# Patient Record
Sex: MALE | Race: WHITE | NOT HISPANIC OR LATINO | Employment: OTHER | URBAN - METROPOLITAN AREA
[De-identification: names, ages, dates, MRNs, and addresses within clinical notes are randomized per-mention and may not be internally consistent; named-entity substitution may affect disease eponyms.]

---

## 2017-01-25 ENCOUNTER — ALLSCRIPTS OFFICE VISIT (OUTPATIENT)
Dept: OTHER | Facility: OTHER | Age: 57
End: 2017-01-25

## 2017-02-01 ENCOUNTER — GENERIC CONVERSION - ENCOUNTER (OUTPATIENT)
Dept: OTHER | Facility: OTHER | Age: 57
End: 2017-02-01

## 2017-02-20 ENCOUNTER — TRANSCRIBE ORDERS (OUTPATIENT)
Dept: LAB | Facility: CLINIC | Age: 57
End: 2017-02-20

## 2017-02-20 ENCOUNTER — APPOINTMENT (OUTPATIENT)
Dept: LAB | Facility: CLINIC | Age: 57
End: 2017-02-20
Payer: COMMERCIAL

## 2017-02-20 DIAGNOSIS — M81.0 OSTEOPOROSIS, UNSPECIFIED: ICD-10-CM

## 2017-02-20 DIAGNOSIS — Z13.1 SCREENING FOR DIABETES MELLITUS: ICD-10-CM

## 2017-02-20 DIAGNOSIS — M81.0 OSTEOPOROSIS, UNSPECIFIED: Primary | ICD-10-CM

## 2017-02-20 LAB
25(OH)D3 SERPL-MCNC: 21.2 NG/ML (ref 30–100)
ALBUMIN SERPL BCP-MCNC: 3.3 G/DL (ref 3.5–5)
CALCIUM SERPL-MCNC: 8.9 MG/DL (ref 8.3–10.1)
CREAT SERPL-MCNC: 0.97 MG/DL (ref 0.6–1.3)
GFR SERPL CREATININE-BSD FRML MDRD: >60 ML/MIN/1.73SQ M
PHOSPHATE SERPL-MCNC: 3.7 MG/DL (ref 2.7–4.5)
PTH-INTACT SERPL-MCNC: 127.4 PG/ML (ref 14–72)

## 2017-02-20 PROCEDURE — 82040 ASSAY OF SERUM ALBUMIN: CPT

## 2017-02-20 PROCEDURE — 82306 VITAMIN D 25 HYDROXY: CPT

## 2017-02-20 PROCEDURE — 82310 ASSAY OF CALCIUM: CPT

## 2017-02-20 PROCEDURE — 36415 COLL VENOUS BLD VENIPUNCTURE: CPT

## 2017-02-20 PROCEDURE — 84166 PROTEIN E-PHORESIS/URINE/CSF: CPT | Performed by: INTERNAL MEDICINE

## 2017-02-20 PROCEDURE — 84100 ASSAY OF PHOSPHORUS: CPT

## 2017-02-20 PROCEDURE — 82565 ASSAY OF CREATININE: CPT

## 2017-02-20 PROCEDURE — 83970 ASSAY OF PARATHORMONE: CPT

## 2017-02-20 PROCEDURE — 84165 PROTEIN E-PHORESIS SERUM: CPT

## 2017-02-22 LAB
ALBUMIN SERPL ELPH-MCNC: 3.64 G/DL (ref 3.5–5)
ALBUMIN SERPL ELPH-MCNC: 52.7 % (ref 52–65)
ALBUMIN UR ELPH-MCNC: 100 %
ALPHA1 GLOB MFR UR ELPH: 0 %
ALPHA1 GLOB SERPL ELPH-MCNC: 0.47 G/DL (ref 0.1–0.4)
ALPHA1 GLOB SERPL ELPH-MCNC: 6.8 % (ref 2.5–5)
ALPHA2 GLOB MFR UR ELPH: 0 %
ALPHA2 GLOB SERPL ELPH-MCNC: 0.81 G/DL (ref 0.4–1.2)
ALPHA2 GLOB SERPL ELPH-MCNC: 11.7 % (ref 7–13)
B-GLOBULIN MFR UR ELPH: 0 %
BETA GLOB ABNORMAL SERPL ELPH-MCNC: 0.56 G/DL (ref 0.4–0.8)
BETA1 GLOB SERPL ELPH-MCNC: 8.1 % (ref 5–13)
BETA2 GLOB SERPL ELPH-MCNC: 6.5 % (ref 2–8)
BETA2+GAMMA GLOB SERPL ELPH-MCNC: 0.45 G/DL (ref 0.2–0.5)
GAMMA GLOB ABNORMAL SERPL ELPH-MCNC: 0.98 G/DL (ref 0.5–1.6)
GAMMA GLOB MFR UR ELPH: 0 %
GAMMA GLOB SERPL ELPH-MCNC: 14.2 % (ref 12–22)
IGG/ALB SER: 1.11 {RATIO} (ref 1.1–1.8)
PROT PATTERN SERPL ELPH-IMP: ABNORMAL
PROT PATTERN UR ELPH-IMP: NORMAL
PROT SERPL-MCNC: 6.9 G/DL (ref 6.4–8.2)
PROT UR-MCNC: 10 MG/DL

## 2017-03-15 ENCOUNTER — GENERIC CONVERSION - ENCOUNTER (OUTPATIENT)
Dept: OTHER | Facility: OTHER | Age: 57
End: 2017-03-15

## 2017-03-22 ENCOUNTER — GENERIC CONVERSION - ENCOUNTER (OUTPATIENT)
Dept: OTHER | Facility: OTHER | Age: 57
End: 2017-03-22

## 2017-04-05 ENCOUNTER — GENERIC CONVERSION - ENCOUNTER (OUTPATIENT)
Dept: OTHER | Facility: OTHER | Age: 57
End: 2017-04-05

## 2017-04-18 ENCOUNTER — ALLSCRIPTS OFFICE VISIT (OUTPATIENT)
Dept: OTHER | Facility: OTHER | Age: 57
End: 2017-04-18

## 2017-04-19 ENCOUNTER — GENERIC CONVERSION - ENCOUNTER (OUTPATIENT)
Dept: OTHER | Facility: OTHER | Age: 57
End: 2017-04-19

## 2017-04-25 ENCOUNTER — GENERIC CONVERSION - ENCOUNTER (OUTPATIENT)
Dept: OTHER | Facility: OTHER | Age: 57
End: 2017-04-25

## 2018-01-09 NOTE — MISCELLANEOUS
Message  Per Dr Talha Wilson, pt  has been referred to [facility] for evaluation for lung transplant  Records have been faxed and Anup Farley will call pt  after records have been reviewed  Active Problems    1  Abnormal chest CT (793 2) (R93 8)   2  Acinetobacter lwoffi infection (041 85) (A49 8)   3  Allergic rhinitis (477 9) (J30 9)   4  Anticoagulant long-term use (V58 61) (Z79 01)   5  Anxiety (300 00) (F41 9)   6  Benign essential hypertension (401 1) (I10)   7  Bronchiectasis (494 0) (J47 9)   8  Bronchiectasis with acute exacerbation (494 1) (J47 1)   9  Chest pain on breathing (786 52) (R07 1)   10  CHF (congestive heart failure) (428 0) (I50 9)   11  Chronic bronchitis (491 9) (J42)   12  Chronic narcotic dependence (304 91) (F11 20)   13  Chronic obstructive pulmonary disease (496) (J44 9)   14  Chronic pain (338 29) (G89 29)   15  Chronic pain syndrome (338 4) (G89 4)   16  Chronic respiratory failure with hypoxia (518 83,799 02) (J96 11)   17  Chronic thoracic spine pain (724 1,338 29) (M54 6,G89 29)   18  Constipation, unspecified constipation type (564 00) (K59 00)   19  Cor pulmonale (416 9) (I27 81)   20  Cough (786 2) (R05)   21  Depression (311) (F32 9)   22  Edema (782 3) (R60 9)   23  Emphysema (492 8) (J43 9)   24  Encounter for long-term use of opiate analgesic (V58 69) (Z79 891)   25  Esophageal reflux (530 81) (K21 9)   26  Grief at loss of child (309 0) (F43 21,Z63 4)   27  Insomnia (780 52) (G47 00)   28  Low back pain (724 2) (M54 5)   29  Lymphadenopathy, mediastinal (785 6) (R59 0)   30  Myofascial pain syndrome (729 1) (M79 1)   31  Nonspecific abnormal findings on radiological and examination of lung field (793 19)    (R91 8)   32  Osteoporosis (733 00) (M81 0)   33  Pain, upper back (724 5) (M54 9)   34  Paroxysmal atrial fibrillation (427 31) (I48 0)   35  Pneumonia (486) (J18 9)   36  PSVT (paroxysmal supraventricular tachycardia) (427 0) (I47 1)   37   Pulmonary mycobacteria (031  0) (A31 0)   38  Pulmonary nodule seen on imaging study (793 11) (R91 1)   39  Rib pain (786 50) (R07 81)   40  Screening for colon cancer (V76 51) (Z12 11)   41  Shortness of breath (786 05) (R06 02)   42  Steroid dependent (V58 65)   43  Traumatic compression fracture of T6 thoracic vertebra (805 2) (S22 050A)   44  Ventricular bigeminy (427 89) (I49 9)   45  Wrist pain, right (719 43) (M25 531)    Current Meds   1  ALPRAZolam 0 5 MG Oral Tablet; TAKE 1 TABLET EVERY 6 TO 8 HOURS AS NEEDED; Therapy: 58WVV1673 to (Evaluate:29Jun2016); Last Rx:14Jun2016 Ordered   2  Calcium 341--519 Oral Tablet; tke one tab daily; Therapy: (Recorded:05Apr2016) to Recorded   3  Diltiazem HCl  MG Oral Capsule Extended Release 24 Hour; TAKE ONE   CAPSULE BY MOUTH EVERY DAY; Therapy: 62IYE1515 to ()  Requested for: 28Apr2016; Last   Rx:28Apr2016 Ordered   4  Eliquis 5 MG Oral Tablet; TWICE A DAY; Therapy: 61WTS3300 to Recorded   5  Flecainide Acetate 100 MG Oral Tablet; take 1 tablet by mouth twice daily; Therapy: 66FLL8519 to (Evaluate:75Gcr1006)  Requested for: 31MMK7918; Last   Rx:17Jan2016 Ordered   6  Furosemide 40 MG Oral Tablet; TAKE 1 TABLET TWICE DAILY; Therapy: 28Apr2016 to (Evaluate:15Pow9358)  Requested for: 28Apr2016; Last   Rx:28Apr2016 Ordered   7  Ipratropium-Albuterol 0 5-2 5 (3) MG/3ML Inhalation Solution; USE 1 VIAL VIA   NEBULIZER EVERY 4 HOURS AS NEEDED; Therapy: 02PVC1344 to (Evaluate:77Klp1448)  Requested for: 20Jun2016; Last   Rx:20Jun2016 Ordered   8  Lansoprazole 30 MG Oral Capsule Delayed Release; TAKE 1 CAPSULE Daily; Therapy: (Recorded:52Tqb7700) to Recorded   9  Metamucil POWD; TWICE A DAY; Therapy: (Recorded:31Aqv6575) to Recorded   10  MiraLax Oral Powder (Polyethylene Glycol 3350); take 17GM (DISSOLVED IN WATER    OR JUICE) by mouth once daily; Therapy: 37MYB5918 to (Evaluate:08Lcf1683) Recorded   11   Oxycodone-Acetaminophen  MG Oral Tablet; TAKE 1 TABLET 4 times daily PRN    pain; Therapy: 16Khi3920 to (Evaluate:51Naw3600); Last Rx:39Ezp6468 Ordered   12  PredniSONE 5 MG Oral Tablet; take 1 tablet by mouth daily; Therapy: 71XKP5123 to (Evaluate:64Aas4226)  Requested for: 60OSB9590; Last    Rx:21Mar2016 Ordered   13  Symbicort 160-4 5 MCG/ACT Inhalation Aerosol; USE 2 INHALATIONS BY MOUTH    TWICE DAILY*** RINSE MOUTH AFTER USE; Therapy: 15DQD2086 to (Evaluate:06Kyt1514)  Requested for: 63QRQ6874 Recorded   14  Ventolin  (90 Base) MCG/ACT Inhalation Aerosol Solution; USE 2 PUFFS BY    MOUTH EVERY 4 TO 6 HOURS AS NEEDED; Therapy: 38XRS9413 to (Evaluate:10Cfp2904)  Requested for: 97HKV0227; Last    Rx:82Pxe7330 Ordered   15  Zolpidem Tartrate 10 MG Oral Tablet; TAKE 1 TABLET AT BEDTIME AS NEEDED; Therapy: 15DBS6722 to (Last Rx:65Dxj0410) Ordered    Allergies    1   No Known Drug Allergies    Signatures   Electronically signed by : Lizzie Plunkett, ; Jun 29 2016 10:01AM EST                       (Author)

## 2018-01-09 NOTE — RESULT NOTES
Message   Recorded as Task   Date: 07/13/2016 10:30 AM, Created By: Tim   Task Name: Med Renewal Request   Assigned To: [address]   Regarding Patient: Key Baron, Status: Active   Comment:    Melody Key - 13 Jul 2016 10:30 AM     TASK CREATED  Caller: Self; Renew Medication; [phone] (Mobile Phone)  Pt lmom stating that [address] instructed him to call the office when he needed refills  Pt was seen on 7/5/16  [facility] - 13 Jul 2016 10:32 AM     TASK REPLIED TO: Previously Assigned To [address]  Could you find out which medications he needs refills on? Also, I will have them ready for him to  from [facility] on Friday, 7/15, so please remind me then  Melody Key - 13 Jul 2016 10:47 AM     TASK EDITED  Spoke to pt,requesting oxycodone 10/325mg one tab q 6hours  Pt will  at [facility] on friday  Please keep active in your tasks for friday  [facility] - 15 Jul 2016 8:10 AM     TASK REPLIED TO: Previously Assigned To [address] printed for pickup today at [facility]  Nubia Argueta - 15 Jul 2016 10:18 AM     TASK EDITED  Pt  came and picked up the script  Thanks   Nano Spears - 15 Jul 2016 10:36 AM     TASK REPLIED TO: Previously Assigned To [place]  Thanks          Signatures   Electronically signed by : Brandon Honeyctut, ; Jul 15 2016 10:48AM EST                       (Author)

## 2018-01-09 NOTE — MISCELLANEOUS
Message   Recorded as Task   Date: 06/14/2016 08:53 AM, Created By: Jonatan Zaragoza   Task Name: Med Renewal Request   Assigned To: [address]   Regarding Patient: Tico Ovalles, Status: Active   CommentJuanita Dakota - 14 Jun 2016 8:53 AM     TASK CREATED  Caller: Self; Renew Medication; [phone] (Home); [phone] (Mobile Phone)  Pt lmom stating that he got out of the hospital and needs a refill  Called pt and he states that he needs a refill of the oxycodone 10/325mg QID, pt doesn't feel that he can decrease to TID at present due to his pain being severe  Stated they were giving him a "heavy duty pain pill" while in the hospital but unaware as to the name  Pt was not discharged w/medications  [address] - 14 Jun 2016 10:03 AM     TASK REPLIED TO: Previously Assigned To [address]  Printed prescription for oxycodone 10/325 mg QID PRN pain here at [facility]  Please ask patient to   The patient also needs to take any remaining oxycontin and oxycodone/acetaminophen 5/325 mg back to his pharmacy for proper disposal    Jonatan Zaragoza - 14 Jun 2016 10:06 AM     TASK EDITED  Pt aware and will have wife p/u script  Active Problems    1  Abnormal chest CT (793 2) (R93 8)   2  Acinetobacter lwoffi infection (041 85) (A49 8)   3  Allergic rhinitis (477 9) (J30 9)   4  Anticoagulant long-term use (V58 61) (Z79 01)   5  Anxiety (300 00) (F41 9)   6  Benign essential hypertension (401 1) (I10)   7  Bronchiectasis (494 0) (J47 9)   8  Bronchiectasis with acute exacerbation (494 1) (J47 1)   9  Chest pain on breathing (786 52) (R07 1)   10  CHF (congestive heart failure) (428 0) (I50 9)   11  Chronic bronchitis (491 9) (J42)   12  Chronic narcotic dependence (304 91) (F11 20)   13  Chronic obstructive pulmonary disease (496) (J44 9)   14  Chronic pain (338 29) (G89 29)   15  Chronic pain syndrome (338 4) (G89 4)   16   Chronic respiratory failure with hypoxia (518 83,799 02) (J96 11)   17  Chronic thoracic spine pain (724 1,338 29) (M54 6,G89 29)   18  Constipation, unspecified constipation type (564 00) (K59 00)   19  Cor pulmonale (416 9) (I27 81)   20  Cough (786 2) (R05)   21  Depression (311) (F32 9)   22  Edema (782 3) (R60 9)   23  Emphysema (492 8) (J43 9)   24  Encounter for long-term use of opiate analgesic (V58 69) (Z79 891)   25  Esophageal reflux (530 81) (K21 9)   26  Grief at loss of child (309 0) (F43 21,Z63 4)   27  Insomnia (780 52) (G47 00)   28  Low back pain (724 2) (M54 5)   29  Lymphadenopathy, mediastinal (785 6) (R59 0)   30  Myofascial pain syndrome (729 1) (M79 1)   31  Nonspecific abnormal findings on radiological and examination of lung field (793 19)    (R91 8)   32  Osteoporosis (733 00) (M81 0)   33  Pain, upper back (724 5) (M54 9)   34  Paroxysmal atrial fibrillation (427 31) (I48 0)   35  Pneumonia (486) (J18 9)   36  PSVT (paroxysmal supraventricular tachycardia) (427 0) (I47 1)   37  Pulmonary mycobacteria (031 0) (A31 0)   38  Pulmonary nodule seen on imaging study (793 11) (R91 1)   39  Rib pain (786 50) (R07 81)   40  Screening for colon cancer (V76 51) (Z12 11)   41  Shortness of breath (786 05) (R06 02)   42  Steroid dependent (V58 65)   43  Traumatic compression fracture of T6 thoracic vertebra (805 2) (S22 050A)   44  Ventricular bigeminy (427 89) (I49 9)   45  Wrist pain, right (719 43) (M25 531)    Current Meds   1  ALPRAZolam 0 5 MG Oral Tablet; TAKE 1 TABLET EVERY 6 TO 8 HOURS AS NEEDED; Therapy: 68VZR5025 to (Evaluate:29Jun2016); Last Rx:14Jun2016 Ordered   2  Calcium 575--815 Oral Tablet; tke one tab daily; Therapy: (Recorded:05Apr2016) to Recorded   3  Diltiazem HCl  MG Oral Capsule Extended Release 24 Hour; TAKE ONE   CAPSULE BY MOUTH EVERY DAY; Therapy: 17VXE1055 to ([phone] [phone])  Requested for: 28Apr2016; Last   Rx:28Apr2016 Ordered   4   Eliquis 5 MG Oral Tablet; TWICE A DAY; Therapy: 70WPT0861 to Recorded   5  Flecainide Acetate 100 MG Oral Tablet; take 1 tablet by mouth twice daily; Therapy: 54FNF1900 to (Evaluate:40Wgz0301)  Requested for: 48RHB7024; Last   Rx:17Jan2016 Ordered   6  Furosemide 40 MG Oral Tablet; TAKE 1 TABLET TWICE DAILY; Therapy: 89Zkw0818 to (Evaluate:58Muh2592)  Requested for: 71Wao4781; Last   Rx:28Apr2016 Ordered   7  Ipratropium-Albuterol 0 5-2 5 (3) MG/3ML Inhalation Solution; USE 1 VIAL VIA   NEBULIZER EVERY 4 HOURS AS NEEDED; Therapy: 70OAQ2451 to (Evaluate:24Jun2016)  Requested for: 88NHC7703; Last   Rx:87Fdq1516 Ordered   8  Lansoprazole 30 MG Oral Capsule Delayed Release; TAKE 1 CAPSULE Daily; Therapy: (Recorded:15Aja4370) to Recorded   9  Lorzone 750 MG Oral Tablet; TAKE 1 TABLET 3 times daily PRN muscle spasms; Therapy: 43IUD0530 to (Evaluate:74Cik8108)  Requested for: 48WAC8231; Last   Rx:07Jun2016 Ordered   10  Metamucil POWD; TWICE A DAY; Therapy: (Recorded:10Qgo7263) to Recorded   11  MiraLax Oral Powder (Polyethylene Glycol 3350); take 17GM (DISSOLVED IN WATER    OR JUICE) by mouth once daily; Therapy: 93YNS1078 to (Evaluate:99Weq6033) Recorded   12  Oxycodone-Acetaminophen  MG Oral Tablet; TAKE 1 TABLET 4 times daily PRN    pain; Therapy: 47Ylv1051 to (Evaluate:18Jql7852); Last Rx:14Jun2016 Ordered   13  PredniSONE 10 MG Oral Tablet; TAKE 4 TABLETS DAILY FOR 3 DAYS,3 TABLETS    DAILY FOR 3 DAYS, 2 TABLETS DAILY FOR 3 DAYS AND 1 TABLET DAILY FOR    3 DAYS, THEN STOP; Therapy: 81VNK1225 to (Evaluate:09Jun2016)  Requested for: 51BTV1984; Last    LO:74DMD4793 Ordered   14  PredniSONE 5 MG Oral Tablet; take 1 tablet by mouth daily; Therapy: 03EHC2037 to (Evaluate:20Apr2016)  Requested for: 42CLD8509; Last    Rx:21Mar2016 Ordered   15  Symbicort 160-4 5 MCG/ACT Inhalation Aerosol; USE 2 INHALATIONS BY MOUTH    TWICE DAILY*** RINSE MOUTH AFTER USE;     Therapy: 28WWN8371 to (Evaluate:31Gjf0988)  Requested for: 86ANR7982 Recorded   16  Ventolin  (90 Base) MCG/ACT Inhalation Aerosol Solution; USE 2 PUFFS BY    MOUTH EVERY 4 TO 6 HOURS AS NEEDED; Therapy: 78KZS0552 to (Evaluate:24Wtd7949)  Requested for: 40LWG4569; Last    Rx:66Djd6267 Ordered   17  Zolpidem Tartrate 10 MG Oral Tablet; TAKE 1 TABLET AT BEDTIME AS NEEDED; Therapy: 67LAA6213 to (Last Rx:40Dxn1806) Ordered    Allergies    1   No Known Drug Allergies    Signatures   Electronically signed by : Tyler Manuel RN; Jun 14 2016 10:06AM EST                       (Author)

## 2018-01-10 NOTE — MISCELLANEOUS
Message  Spoke with Isaiah Hensley about the Oxycontin  Says he tried to twist off the top to a container yesterday and his back pain increased  He has a prior compression fracture of the back  He WPU Rx but also agrees to see Dr Kristin Hendrix to see if pain management can help with the pain        Plan  Traumatic compression fracture of T6 thoracic vertebra    · OxyCODONE HCl - 5 MG Oral Tablet; 1-2 EVERY 6 HOURS AS NEEDED FOR  PAIN    Signatures   Electronically signed by : SARAH Barrios ; Jan 28 2016  8:54AM EST                       (Author)

## 2018-01-10 NOTE — MISCELLANEOUS
History of Present Illness  TCM Communication [facility]: The patient is being contacted for follow-up after hospitalization  He was hospitalized at [facility]  The dates of hospitalization: June 22, 2016, date of admission: June 22, 2016, date of discharge: June 29, 2016  Diagnosis: acute respiratory failure with hypoxia & shortness of breath  He was discharged to home  Follow-up appointments with other specialists: pt  going to follow up with [facility] in September, date unknown @ this time  Communication performed and completed by Brenda Escobar   HPI: He has a follow-up with Dr [address] 7/21/16 at 1:00  Active Problems    1  Abnormal chest CT (793 2) (R93 8)   2  Acinetobacter lwoffi infection (041 85) (A49 8)   3  Allergic rhinitis (477 9) (J30 9)   4  Anticoagulant long-term use (V58 61) (Z79 01)   5  Anxiety (300 00) (F41 9)   6  Benign essential hypertension (401 1) (I10)   7  Bronchiectasis (494 0) (J47 9)   8  Bronchiectasis with acute exacerbation (494 1) (J47 1)   9  Chest pain on breathing (786 52) (R07 1)   10  CHF (congestive heart failure) (428 0) (I50 9)   11  Chronic bronchitis (491 9) (J42)   12  Chronic narcotic dependence (304 91) (F11 20)   13  Chronic obstructive pulmonary disease (496) (J44 9)   14  Chronic pain (338 29) (G89 29)   15  Chronic pain syndrome (338 4) (G89 4)   16  Chronic respiratory failure with hypoxia (518 83,799 02) (J96 11)   17  Chronic thoracic spine pain (724 1,338 29) (M54 6,G89 29)   18  Constipation, unspecified constipation type (564 00) (K59 00)   19  Cor pulmonale (416 9) (I27 81)   20  Cough (786 2) (R05)   21  Depression (311) (F32 9)   22  Edema (782 3) (R60 9)   23  Emphysema (492 8) (J43 9)   24  Encounter for long-term use of opiate analgesic (V58 69) (Z79 891)   25  Esophageal reflux (530 81) (K21 9)   26  Grief at loss of child (309 0) (F43 21,Z63 4)   27  Insomnia (780 52) (G47 00)   28  Low back pain (724 2) (M54 5)   29  Lymphadenopathy, mediastinal (785 6) (R59 0)   30  Myofascial pain syndrome (729 1) (M79 1)   31  Nonspecific abnormal findings on radiological and examination of lung field (793 19)    (R91 8)   32  Osteoporosis (733 00) (M81 0)   33  Pain, upper back (724 5) (M54 9)   34  Paroxysmal atrial fibrillation (427 31) (I48 0)   35  Pneumonia (486) (J18 9)   36  PSVT (paroxysmal supraventricular tachycardia) (427 0) (I47 1)   37  Pulmonary mycobacteria (031 0) (A31 0)   38  Pulmonary nodule seen on imaging study (793 11) (R91 1)   39  Rib pain (786 50) (R07 81)   40  Screening for colon cancer (V76 51) (Z12 11)   41  Shortness of breath (786 05) (R06 02)   42  Steroid dependent (V58 65)   43  Traumatic compression fracture of T6 thoracic vertebra (805 2) (S22 050A)   44  Ventricular bigeminy (427 89) (I49 9)   45  Wrist pain, right (719 43) (M25 531)    Past Medical History    1  History of Closed Capitellar Fracture Of The Left Humerus Without Extension (812 44)   2  History of Closed Epiphyseal Fracture Of The Proximal End Of The Left Humerus   (812 09)   3  History of Closed Fracture Of The Left Medial Tibial Plateau (Schatzker 4) (823 00)   4  History of Closed Fracture Of The Left Tibia Intercondylar [place] With Anterior Edge   Elevation Lauren Ards And Calin 1) (823 00)   5  History of Hand pain, unspecified laterality   6  History of acute respiratory failure (V12 69) (Z87 09)   7  History of acute sinusitis (V12 69) (Z87 09)   8  History of bacterial pneumonia (V12 61) (Z87 01)   9  History of chronic obstructive lung disease (V12 69) (Z87 09)   10  History of fracture of humerus (V15 51) (Z87 81)   11  History of Paroxysmal atrial fibrillation (427 31) (I48 0)   12  Pneumonia (486) (J18 9)   13  History of Post Adult Respiratory Distress Syndrome (518 82)   14  History of Septic Shock (Pseudomonas) (785 59)    Surgical History    1  History of Cataract Surgery   2  History of Complete Colonoscopy   3   History of Femoral Hernia Repair   4  History of Inguinal Hernia Repair   5  History of Umbilical Hernia Repair    Family History  Mother    1  Family history of Emphysema  Father    2  Family history of Chronic Interstitial Lung Disease  Family History    3  Family history of Arthritis (V17 7)   4  Family history of Lung Cancer (V16 1)   5  Family history of Osteoporosis (V17 81)    Social History    · Denied: History of Alcohol Use (History)   · Caffeine Use   · Current Diet High In Sugar Includes High Sugar Beverages   · Daily Coffee Consumption (8  Cups/Day)   · Education - Highest Level Achieved (9  Years Completed)   · Former smoker (S34 50) (Z87 101)   · Marital History - Currently    · Occupation:   · Denied: History of Tobacco Use    Current Meds   1  ALPRAZolam 0 5 MG Oral Tablet; TAKE 1 TABLET EVERY 6 TO 8 HOURS AS NEEDED; Therapy: 21AYN7318 to (Evaluate:29Jun2016); Last Rx:14Jun2016 Ordered   2  Calcium 594--375 Oral Tablet; tke one tab daily; Therapy: (Recorded:05Apr2016) to Recorded   3  Diltiazem HCl  MG Oral Capsule Extended Release 24 Hour; TAKE ONE   CAPSULE BY MOUTH EVERY DAY; Therapy: 18SHB0536 to ()  Requested for: 28Apr2016; Last   Rx:28Apr2016 Ordered   4  Eliquis 5 MG Oral Tablet; TWICE A DAY; Therapy: 40WWO4749 to Recorded   5  Flecainide Acetate 100 MG Oral Tablet; take 1 tablet by mouth twice daily; Therapy: 64COU3686 to (Evaluate:82Rdx2825)  Requested for: 84JJH9901; Last   Rx:17Jan2016 Ordered   6  Furosemide 40 MG Oral Tablet; TAKE 1 TABLET TWICE DAILY; Therapy: 28Apr2016 to (Evaluate:70Rhl1419)  Requested for: 28Apr2016; Last   Rx:28Apr2016 Ordered   7  Ipratropium-Albuterol 0 5-2 5 (3) MG/3ML Inhalation Solution; USE 1 VIAL VIA   NEBULIZER EVERY 4 HOURS AS NEEDED; Therapy: 47OPN3131 to (Evaluate:37Mkk5594)  Requested for: 20Jun2016; Last   Rx:20Jun2016 Ordered   8  Lansoprazole 30 MG Oral Capsule Delayed Release; TAKE 1 CAPSULE Daily;    Therapy: (Recorded:82Pku7495) to Recorded   9  Lorzone 750 MG Oral Tablet; TAKE 1 TABLET 3 times daily PRN muscle spasms; Therapy: 29LJC9293 to (Evaluate:56Nuy1007)  Requested for: 65TCA6052; Last   Rx:65Mjb2376 Ordered   10  Metamucil POWD; TWICE A DAY; Therapy: (Recorded:71Fnp6053) to Recorded   11  MiraLax Oral Powder; take 17GM (DISSOLVED IN WATER OR JUICE) by mouth once    daily; Therapy: 00QRI8570 to (Evaluate:72Btb2273) Recorded   12  Oxycodone-Acetaminophen  MG Oral Tablet; TAKE 1 TABLET 4 times daily PRN    pain; Therapy: 00Sky5624 to (Evaluate:16Rly6357); Last Rx:14Jun2016 Ordered   13  PredniSONE 10 MG Oral Tablet; Take 4 QD x 5 days then 3 QD x 5 days then 2 QD    x 5 days then 1 daily; Therapy: 60VIP9305 to (Francisco Bean)  Requested for: 10QYN6104; Last    Rx:29Jun2016 Ordered   14  PredniSONE 5 MG Oral Tablet; take 1 tablet by mouth daily; Therapy: 08ODG6608 to (Evaluate:06Bgr9751)  Requested for: 67LLC6853; Last    Rx:21Mar2016 Ordered   15  Symbicort 160-4 5 MCG/ACT Inhalation Aerosol; USE 2 INHALATIONS BY MOUTH TWICE    DAILY*** RINSE MOUTH AFTER USE; Therapy: 09EDI0987 to (Evaluate:27Vru7166)  Requested for: 93GJG9912 Recorded   16  Ventolin  (90 Base) MCG/ACT Inhalation Aerosol Solution; USE 2 PUFFS BY    MOUTH EVERY 4 TO 6 HOURS AS NEEDED; Therapy: 46CVC5198 to (Evaluate:37Utz0437)  Requested for: 89ILS7403; Last    Rx:72Ybu5878 Ordered   17  Zolpidem Tartrate 10 MG Oral Tablet; TAKE 1 TABLET AT BEDTIME AS NEEDED; Therapy: 71CWU7062 to (Last Rx:38Rua7585) Ordered    Allergies    1  No Known Drug Allergies    Health Management  Screening for colon cancer   COLONOSCOPY; every 10 years; Next Due: 22ERI5741; Overdue    Future Appointments    Date/Time Provider Specialty Site   09/19/2016 11:30 AM SARAH Mendoza  Family Medicine FAMILY PRACTICE [facility]   08/02/2016 08:15 AM [address] SARAH Glass   Pain Management [facility] 07/21/2016 01:00 PM Betzaida Sánchez DO Pulmonary Medicine  [address]     Signatures   Electronically signed by : Miriam Dong, ; Jul 5 2016 10:16AM EST                       (Author)    Electronically signed by : SARAH Buck ; Jul 5 2016 10:31AM EST                       (Author)

## 2018-01-10 NOTE — MISCELLANEOUS
Message   Recorded as Task   Date: 02/23/2016 09:53 AM, Created By: Ja Pulido   Task Name: Miscellaneous   Assigned To: [address] [facility] [address] Clinical,Team   Regarding Patient: Shanthi Gonzalez, Status: Active   CommentStella Suárez - 23 Feb 2016 9:53 AM     TASK CREATED  patient called to say that the [place] is still not working and he would like someone to call him and advise what he should do  It also leaves a very bad taste in his mouth  Please call at either home or cell  Try home first @ [phone] and cell @ [phone]  Thank you   Nano Spears - 23 Feb 2016 10:08 AM     TASK REPLIED TO: Previously Assigned To [employer] [address]  Please qualify if the patient is getting absolutely no pain relief or some pain relief  Also find out if the patient has ever tried tramadol for his pain  Melody Key - 23 Feb 2016 11:24 AM     TASK EDITED  Spoke with pt, states that he does not receive pain relief,feels it is a placebo  Pt does not believe he has ever taken tramadol, stated that he is rather new to having to take medication for pain  Patience Rachel - 23 Feb 2016 11:43 AM     TASK REPLIED TO: Previously Assigned To [employer]  Give patient instructions for disposing [place] ER safely  I will print prescription for tramadol 50 mg 1-2 tabs TID PRN pain  Melody Key - 23 Feb 2016 11:57 AM     TASK EDITED  Pt aware  Active Problems    1  Abnormal chest CT (793 2) (R93 8)   2  Acinetobacter lwoffi infection (041 85) (A49 8)   3  Allergic rhinitis (477 9) (J30 9)   4  Benign essential hypertension (401 1) (I10)   5  Bronchiectasis (494 0) (J47 9)   6  Bronchiectasis with acute exacerbation (494 1) (J47 1)   7  Chest pain on breathing (786 52) (R07 1)   8  Chronic bronchitis (491 9) (J42)   9  Chronic narcotic dependence (304 91) (F11 20)   10  Chronic obstructive pulmonary disease (496) (J44 9)   11  Chronic pain syndrome (338 4) (G89 4)   12   Chronic thoracic spine pain (724 1,338 29) (M54 6,G89 29)   13  Cor pulmonale (416 9) (I27 81)   14  Cough (786 2) (R05)   15  Edema (782 3) (R60 9)   16  Emphysema (492 8) (J43 9)   17  Encounter for long-term use of opiate analgesic (V58 69) (Z79 891)   18  Esophageal reflux (530 81) (K21 9)   19  Grief at loss of child (309 0) (F43 21,Z63 4)   20  Hypoxia (799 02) (R09 02)   21  Insomnia (780 52) (G47 00)   22  Lymphadenopathy, mediastinal (785 6) (R59 0)   23  Nonspecific abnormal findings on radiological and examination of lung field (793 19)    (R91 8)   24  Osteoporosis (733 00) (M81 0)   25  Pain, upper back (724 5) (M54 9)   26  Paroxysmal atrial fibrillation (427 31) (I48 0)   27  Pneumonia (486) (J18 9)   28  PSVT (paroxysmal supraventricular tachycardia) (427 0) (I47 1)   29  Pulmonary mycobacteria (031 0) (A31 0)   30  Pulmonary nodule seen on imaging study (793 11) (R91 1)   31  Rib pain (786 50) (R07 81)   32  Screening for colon cancer (V76 51) (Z12 11)   33  Shortness of breath (786 05) (R06 02)   34  Steroid dependent (V58 65)   35  Traumatic compression fracture of T6 thoracic vertebra (805 2) (S22 050A)   36  Ventricular bigeminy (427 89) (I49 9)   37  Wrist pain, right (719 43) (M25 531)    Current Meds   1  Aspirin  MG Oral Tablet Delayed Release; Take 1 tablet daily; Therapy: 50WYU4216 to (Evaluate:07Jan2016) Recorded   2  Calcium TABS; Therapy: (Recorded:49Lep2663) to Recorded   3  Diltiazem HCl  MG Oral Capsule Extended Release 24 Hour; TAKE ONE   CAPSULE BY MOUTH EVERY DAY; Therapy: 25VLI5859 to (Napoleon Ceballos)  Requested for: 61WKK9141; Last   Rx:06Oct2015 Ordered   4  Flecainide Acetate 100 MG Oral Tablet; take 1 tablet by mouth twice daily; Therapy: 97NFF5951 to (Evaluate:44Rjh1083)  Requested for: 38BAG9580; Last   Rx:17Jan2016 Ordered   5  Forteo 600 MCG/2 4ML Subcutaneous Solution; INJECT 20 MCG  SUBCUTANEOUSLY   ONCE DAILY AS DIRECTED;    Therapy: 64NPJ5534 to (Evaluate:10Mar2016) Requested for: 33DLN7168; Last   Rx:38Hil0389 Ordered   6  Furosemide 40 MG Oral Tablet; TAKE 1 TABLET DAILY; Therapy: 90NFR7602 to (Evaluate:48Ncb3096)  Requested for: 12Jan2016; Last   Rx:12Jan2016 Ordered   7  Ipratropium-Albuterol 0 5-2 5 (3) MG/3ML Inhalation Solution; USE 1 VIAL VIA   NEBULIZER EVERY 4 HOURS AS NEEDED; Therapy: 02NJO9283 to (Evaluate:20Jan2016)  Requested for: 53Yho2937; Last   Rx:41Okp8203 Ordered   8  Lansoprazole 30 MG Oral Capsule Delayed Release; TAKE 1 CAPSULE Daily; Therapy: (Recorded:03Apr2014) to Recorded   9  Metamucil POWD; TWICE A DAY; Therapy: (Recorded:58Ccf9293) to Recorded   10  Nebulizer/Tubing/Mouthpiece KIT; USE AS DIRECTED; Therapy: 31YHK6007 to (Last Rx:51Tlc9849) Ordered   11  PredniSONE 5 MG Oral Tablet; take 1 tablet by mouth daily; Therapy: 61BPQ7292 to (Henna Sung)  Requested for: 12SUL1886; Last    Rx:27Yet4002 Ordered   12  Symbicort 160-4 5 MCG/ACT Inhalation Aerosol; USE 2 INHALATIONS BY MOUTH    TWICE DAILY*** RINSE MOUTH AFTER USE; Therapy: 94UIQ6704 to (Evaluate:39Btf3165)  Requested for: 14IGH6973; Last    Rx:12Jan2016 Ordered   13  TraMADol HCl - 50 MG Oral Tablet; 1-2 TAB PO TID PRN PAIN;    Therapy: 67VRC4778 to (Evaluate:24Mar2016); Last Rx:02Ovo8946 Ordered   14  Ventolin  (90 Base) MCG/ACT Inhalation Aerosol Solution; USE 2 PUFFS BY    MOUTH EVERY 4 TO 6 HOURS AS NEEDED; Therapy: 41YOG3248 to (Evaluate:44Lcl9228)  Requested for: 07UUH7315; Last    Rx:21Nov2014 Ordered   15  Zolpidem Tartrate 10 MG Oral Tablet; TAKE 1 TABLET AT BEDTIME AS NEEDED; Therapy: 86Nvp8928 to (Last Rx:33Tnq8820) Ordered    Allergies    1   No Known Drug Allergies    Signatures   Electronically signed by : Nicholas Roberson RN; Feb 23 2016 11:57AM EST                       (Author)

## 2018-01-10 NOTE — MISCELLANEOUS
Assessment   1  Bronchiectasis with acute exacerbation (494 1) (J47 1)  2  CHF (congestive heart failure) (428 0) (I50 9)  3  Chronic obstructive pulmonary disease (496) (J44 9)  4  Chronic respiratory failure with hypoxia (518 83,799 02) (J96 11)    Hypoxia with increased somnolence due to COPD/Bronchiectasis/CHF  Plan  I feel he needs to go to the ER due to his hypoxia  I called in advance  Chief Complaint  Chief Complaint Free Text Note Form: GISELA visit      History of Present Illness  TCM Communication [facility]: The patient is being contacted for follow-up after hospitalization  The dates of hospitalization: June 11, 2016, date of admission: June 11, 2016, date of discharge: June 13,2013  Diagnosis: shortness of breath  He was discharged to home  Counseling was provided to the patient  Communication performed and completed by Vero Burrows   HPI: He was hospitalized recently at [facility] for increased SOB which was felt to be a combination of COPD/bronchiectasis and CHF  Treated with antibiotics, steroids, diuresis, oxygen  He has been on 3 L nasal O2 around the clock  He had been feeling a bit better on discharge  He had a bad morning today  Dr Arcelia Gandhi started him on a longer acting narcotic yesterday but it isn't entered in the med list  [facility] comes twice a week  He is back to his Prednisone 5mg daily  He has a difficult time breathing due to his chronic back pain  No chest pain  He gets some edema in the feet  He sees Dr Xavier Nunez in July  Appetite is "all right " Sleeping OK  Awakens at 1:00 AM for nebulizer and pain med  Chronic cough  Might be worse with humidity  He has been falling asleep easily  His O2 sat this morning was 58% at home with 3L  Active Problems   1  Abnormal chest CT (793 2) (R93 8)  2  Acinetobacter lwoffi infection (041 85) (A49 8)  3  Allergic rhinitis (477 9) (J30 9)  4  Anticoagulant long-term use (V58 61) (Z79 01)  5  Anxiety (300 00) (F41 9)  6   Benign essential hypertension (401 1) (I10)  7  Bronchiectasis (494 0) (J47 9)  8  Bronchiectasis with acute exacerbation (494 1) (J47 1)  9  Chest pain on breathing (786 52) (R07 1)  10  CHF (congestive heart failure) (428 0) (I50 9)  11  Chronic bronchitis (491 9) (J42)  12  Chronic narcotic dependence (304 91) (F11 20)  13  Chronic obstructive pulmonary disease (496) (J44 9)  14  Chronic pain (338 29) (G89 29)  15  Chronic pain syndrome (338 4) (G89 4)  16  Chronic respiratory failure with hypoxia (518 83,799 02) (J96 11)  17  Chronic thoracic spine pain (724 1,338 29) (M54 6,G89 29)  18  Constipation, unspecified constipation type (564 00) (K59 00)  19  Cor pulmonale (416 9) (I27 81)  20  Cough (786 2) (R05)  21  Depression (311) (F32 9)  22  Edema (782 3) (R60 9)  23  Emphysema (492 8) (J43 9)  24  Encounter for long-term use of opiate analgesic (V58 69) (Z79 891)  25  Esophageal reflux (530 81) (K21 9)  26  Grief at loss of child (309 0) (F43 21,Z63 4)  27  Insomnia (780 52) (G47 00)  28  Low back pain (724 2) (M54 5)  29  Lymphadenopathy, mediastinal (785 6) (R59 0)  30  Myofascial pain syndrome (729 1) (M79 1)  31  Nonspecific abnormal findings on radiological and examination of lung field (793 19)    (R91 8)  32  Osteoporosis (733 00) (M81 0)  33  Pain, upper back (724 5) (M54 9)  34  Paroxysmal atrial fibrillation (427 31) (I48 0)  35  Pneumonia (486) (J18 9)  36  PSVT (paroxysmal supraventricular tachycardia) (427 0) (I47 1)  37  Pulmonary mycobacteria (031 0) (A31 0)  38  Pulmonary nodule seen on imaging study (793 11) (R91 1)  39  Rib pain (786 50) (R07 81)  40  Screening for colon cancer (V76 51) (Z12 11)  41  Shortness of breath (786 05) (R06 02)  42  Steroid dependent (V58 65)  43  Traumatic compression fracture of T6 thoracic vertebra (805 2) (S22 050A)  44  Ventricular bigeminy (427 89) (I49 9)  45  Wrist pain, right (719 43) (M22 531)    Past Medical History   1   History of Closed Capitellar Fracture Of The Left Humerus Without Extension (812 44)  2  History of Closed Epiphyseal Fracture Of The Proximal End Of The Left Humerus   (812 09)  3  History of Closed Fracture Of The Left Medial Tibial Plateau (Schatzker 4) (823 00)  4  History of Closed Fracture Of The Left Tibia Intercondylar Melville With Anterior Edge   Elevation Crispin Phillip Calin 1) (823 00)  5  History of Hand pain, unspecified laterality  6  History of acute respiratory failure (V12 69) (Z87 09)  7  History of acute sinusitis (V12 69) (Z87 09)  8  History of bacterial pneumonia (V12 61) (Z87 01)  9  History of chronic obstructive lung disease (V12 69) (Z87 09)  10  History of fracture of humerus (V15 51) (Z87 81)  11  History of Paroxysmal atrial fibrillation (427 31) (I48 0)  12  Pneumonia (486) (J18 9)  13  History of Post Adult Respiratory Distress Syndrome (518 82)  14  History of Septic Shock (Pseudomonas) (785 59)    Surgical History   1  History of Cataract Surgery  2  History of Complete Colonoscopy  3  History of Femoral Hernia Repair  4  History of Inguinal Hernia Repair  5  History of Umbilical Hernia Repair    Family History  Mother   1  Family history of Emphysema  Father   2  Family history of Chronic Interstitial Lung Disease  Family History   3  Family history of Arthritis (V17 7)  4  Family history of Lung Cancer (V16 1)  5  Family history of Osteoporosis (V17 81)    Social History    · Denied: History of Alcohol Use (History)   · Caffeine Use   · Current Diet High In Sugar Includes High Sugar Beverages   · Daily Coffee Consumption (8  Cups/Day)   · Education - Highest Level Achieved (9  Years Completed)   · Former smoker (D72 07) (Z87 891)   · Marital History - Currently    · Occupation:   · Denied: History of Tobacco Use    Current Meds  1  ALPRAZolam 0 5 MG Oral Tablet; TAKE 1 TABLET EVERY 6 TO 8 HOURS AS NEEDED; Therapy: 84TSX3584 to (Evaluate:29Jun2016); Last Rx:14Jun2016 Ordered  2   Calcium 294--863 Oral Tablet; tke one tab daily; Therapy: (Recorded:18Jwt6934) to Recorded  3  Diltiazem HCl  MG Oral Capsule Extended Release 24 Hour; TAKE ONE   CAPSULE BY MOUTH EVERY DAY; Therapy: 41ZQU6861 to (032 304 86 43)  Requested for: 28Apr2016; Last   Rx:28Apr2016 Ordered  4  Eliquis 5 MG Oral Tablet; TWICE A DAY; Therapy: 34GVN3339 to Recorded  5  Flecainide Acetate 100 MG Oral Tablet; take 1 tablet by mouth twice daily; Therapy: 77ODX5438 to (Evaluate:14Ksa0658)  Requested for: 09LGA3417; Last   Rx:88Cfe0543 Ordered  6  Furosemide 40 MG Oral Tablet; TAKE 1 TABLET TWICE DAILY; Therapy: 99Aze0020 to (Evaluate:04Zeh0712)  Requested for: 28Apr2016; Last   Rx:28Apr2016 Ordered  7  Ipratropium-Albuterol 0 5-2 5 (3) MG/3ML Inhalation Solution; USE 1 VIAL VIA   NEBULIZER EVERY 4 HOURS AS NEEDED; Therapy: 42GRS3928 to (Evaluate:98Exv6875)  Requested for: 45BNZ2427; Last   Rx:01Jdd2831 Ordered  8  Lansoprazole 30 MG Oral Capsule Delayed Release; TAKE 1 CAPSULE Daily; Therapy: (Recorded:80Oss7270) to Recorded  9  Metamucil POWD; TWICE A DAY; Therapy: (Recorded:82Xlx7202) to Recorded  10  MiraLax Oral Powder; take 17GM (DISSOLVED IN WATER OR JUICE) by mouth once    daily; Therapy: 13TQZ4867 to (Evaluate:87Maf3688) Recorded  11  Oxycodone-Acetaminophen 5-325 MG Oral Tablet; TAKE 1 TABLET 3 times daily PRN    pain; Therapy: 46XPM9628 to (Evaluate:46Zlm7489); Last Rx:07Xlb5363 Ordered  12  OxyCONTIN 10 MG Oral Tablet ER 12 Hour Abuse-Deterrent; TAKE 1 TABLET EVERY 12    HOURS DAILY; Therapy: 36YEK9649 to (Evaluate:59Reh3634); Last Rx:57Vrb8215 Ordered  13  PredniSONE 5 MG Oral Tablet; take 1 tablet by mouth daily; Therapy: 53TOH4061 to (Evaluate:20Apr2016)  Requested for: 43QVB6814; Last    Rx:21Mar2016 Ordered  14  Symbicort 160-4 5 MCG/ACT Inhalation Aerosol; USE 2 INHALATIONS BY MOUTH TWICE    DAILY*** RINSE MOUTH AFTER USE; Therapy: 88GAW7850 to (Evaluate:21Ean6592)  Requested for: 27OHB3778 Recorded  15  Ventolin  (90 Base) MCG/ACT Inhalation Aerosol Solution; USE 2 PUFFS BY    MOUTH EVERY 4 TO 6 HOURS AS NEEDED; Therapy: 74TXP0315 to (Evaluate:57Guj6400)  Requested for: 43FBR3413; Last    Rx:97Nwk4079 Ordered  16  Zolpidem Tartrate 10 MG Oral Tablet; TAKE 1 TABLET AT BEDTIME AS NEEDED; Therapy: 77HUX6166 to (Last Rx:36Yyl7102) Ordered    Allergies   1  No Known Drug Allergies    Vitals  Signs [Data Includes: Current Encounter]   Recorded: 72AUG9339 70:36SN   Systolic: 960, LUE, Supine  Diastolic: 70, LUE, Supine  O2 Saturation: 78, Nasal Cannula  FiO2: 5, Nasal Cannula    Physical Exam    Constitutional   General appearance: Abnormal   chronically ill and uncomfortable  Pulmonary   Respiratory effort: Abnormal   Respiratory rate: tachypnea  Assessment of respiratory effort revealed shallow respirations  Respiratory Findings: wet cough  Auscultation of lungs: Abnormal   Auscultation of the lungs revealed decreased breath sounds diffusely and prolonged expiratory time  diffuse rales/crackles bilaterally  diffuse rhonchi bilaterally  Cardiovascular   Auscultation of heart: Normal rate and rhythm, normal S1 and S2, without murmurs  Examination of extremities for edema and/or varicosities: Abnormal   bilateral ankle 1+ pitting edema  Psychiatric   Orientation to person, place and time: Normal     Mood and affect: Normal          Health Management  Screening for colon cancer   COLONOSCOPY; every 10 years; Next Due: 13NQF5339; Overdue    Future Appointments    Date/Time Provider Specialty Site   09/19/2016 11:30 AM SARAH Cortes  Family Medicine [facility]   07/05/2016 08:30 SARAH Monroy   Neurosurgery [facility] NEUROSURGICAL   07/21/2016 01:00 PM Keith Willis DO Pulmonary Medicine  [address]     Signatures   Electronically signed by : Teresa Mason, ; Jun 14 2016  8:27AM EST                       (Author)    Electronically signed by : SARAH Howard ; Jun 22 2016  8:30AM EST                       (Author)    Electronically signed by : SARAH Howard ; Jun 22 2016  8:31AM EST                       (Author)

## 2018-01-10 NOTE — MISCELLANEOUS
Message   Recorded as Task   Date: 06/21/2016 03:29 PM, Created By: Rudi Schirmer   Task Name: Care Coordination   Assigned To: [address] [facility] [address] Clinical,Team   Regarding Patient: Shanthi Gonzalez, Status: Active   Comment:    Melody Key - 21 Jun 2016 3:29 PM     TASK CREATED  Caller: Georgie/JOSE, Other; Care Coordination; [phone]  Received message from Irma Castle, Nurse @ [facility] stating that the pt is reporting to her that nothing he has been prescribed is helping with his pain  He can't tolerate to walk  Currently taking percocet 10/325 one tab QID  Spoke to pt he states that the percocet is lasting approx 1 5-2 hours and his pain returns in his lower back at a 10/10  Pt also having back spasms and the robaxin does not help so he stopped the robaxin  Please advise  [facility] [employer] [address] - 21 Jun 2016 3:40 PM     TASK REPLIED TO: Previously Assigned To [address] [place] [address] Road  Will send Lorzone to [facility] [place]  If the patient has leftover Oxycontin, please ask him to restart Oxycontin 10 mg q12hrs  Melody Key - 21 Jun 2016 3:52 PM     TASK EDITED  Per conversation w/Dr Spears pt is to decrease percocet 10/325mg to tid as well as other instructions  Spoke to pt, instructed him to take oxycontin 10mg q 12 hours, decrease percocet 10/325mg to tid and lorzone was sent to [email or web address] and they will contact the pt and send it to him directly  Pt verbalized understanding  [facility] [employer] [facility] - 21 Jun 2016 4:28 PM     TASK REPLIED TO: Previously Assigned To [place]  Thanks  Active Problems    1  Abnormal chest CT (793 2) (R93 8)   2  Acinetobacter lwoffi infection (041 85) (A49 8)   3  Allergic rhinitis (477 9) (J30 9)   4  Anticoagulant long-term use (V58 61) (Z79 01)   5  Anxiety (300 00) (F41 9)   6  Benign essential hypertension (401 1) (I10)   7  Bronchiectasis (494 0) (J47 9)   8  Bronchiectasis with acute exacerbation (494 1) (J47 1)   9   Chest pain on breathing (786 52) (R07 1)   10  CHF (congestive heart failure) (428 0) (I50 9)   11  Chronic bronchitis (491 9) (J42)   12  Chronic narcotic dependence (304 91) (F11 20)   13  Chronic obstructive pulmonary disease (496) (J44 9)   14  Chronic pain (338 29) (G89 29)   15  Chronic pain syndrome (338 4) (G89 4)   16  Chronic respiratory failure with hypoxia (518 83,799 02) (J96 11)   17  Chronic thoracic spine pain (724 1,338 29) (M54 6,G89 29)   18  Constipation, unspecified constipation type (564 00) (K59 00)   19  Cor pulmonale (416 9) (I27 81)   20  Cough (786 2) (R05)   21  Depression (311) (F32 9)   22  Edema (782 3) (R60 9)   23  Emphysema (492 8) (J43 9)   24  Encounter for long-term use of opiate analgesic (V58 69) (Z79 891)   25  Esophageal reflux (530 81) (K21 9)   26  Grief at loss of child (309 0) (F43 21,Z63 4)   27  Insomnia (780 52) (G47 00)   28  Low back pain (724 2) (M54 5)   29  Lymphadenopathy, mediastinal (785 6) (R59 0)   30  Myofascial pain syndrome (729 1) (M79 1)   31  Nonspecific abnormal findings on radiological and examination of lung field (793 19)    (R91 8)   32  Osteoporosis (733 00) (M81 0)   33  Pain, upper back (724 5) (M54 9)   34  Paroxysmal atrial fibrillation (427 31) (I48 0)   35  Pneumonia (486) (J18 9)   36  PSVT (paroxysmal supraventricular tachycardia) (427 0) (I47 1)   37  Pulmonary mycobacteria (031 0) (A31 0)   38  Pulmonary nodule seen on imaging study (793 11) (R91 1)   39  Rib pain (786 50) (R07 81)   40  Screening for colon cancer (V76 51) (Z12 11)   41  Shortness of breath (786 05) (R06 02)   42  Steroid dependent (V58 65)   43  Traumatic compression fracture of T6 thoracic vertebra (805 2) (S22 050A)   44  Ventricular bigeminy (427 89) (I49 9)   45  Wrist pain, right (179 43) (M22 531)    Current Meds   1  ALPRAZolam 0 5 MG Oral Tablet; TAKE 1 TABLET EVERY 6 TO 8 HOURS AS NEEDED; Therapy: 16IQW4691 to (Evaluate:29Jun2016); Last Rx:14Jun2016 Ordered   2   Calcium 128--481 Oral Tablet; tke one tab daily; Therapy: (Recorded:46Kpj4018) to Recorded   3  Diltiazem HCl  MG Oral Capsule Extended Release 24 Hour; TAKE ONE   CAPSULE BY MOUTH EVERY DAY; Therapy: 08QOJ0466 to (797 1512)  Requested for: 28Apr2016; Last   Rx:28Apr2016 Ordered   4  Eliquis 5 MG Oral Tablet; TWICE A DAY; Therapy: 50GRR7305 to Recorded   5  Flecainide Acetate 100 MG Oral Tablet; take 1 tablet by mouth twice daily; Therapy: 74DDV6420 to (Evaluate:25Iuc8594)  Requested for: 46YDA0746; Last   Rx:17Jan2016 Ordered   6  Furosemide 40 MG Oral Tablet; TAKE 1 TABLET TWICE DAILY; Therapy: 28Apr2016 to (Evaluate:68Rxu4695)  Requested for: 28Apr2016; Last   Rx:28Apr2016 Ordered   7  Ipratropium-Albuterol 0 5-2 5 (3) MG/3ML Inhalation Solution; USE 1 VIAL VIA   NEBULIZER EVERY 4 HOURS AS NEEDED; Therapy: 94JRH3225 to (Evaluate:19Xwp4880)  Requested for: 20Jun2016; Last   Rx:20Jun2016 Ordered   8  Lansoprazole 30 MG Oral Capsule Delayed Release; TAKE 1 CAPSULE Daily; Therapy: (Recorded:86Yuo6978) to Recorded   9  Lorzone 750 MG Oral Tablet; TAKE 1 TABLET 3 times daily PRN muscle spasms; Therapy: 28JFX0842 to (Evaluate:29Oyi3202)  Requested for: 13QIW1098; Last   Rx:07Jun2016 Ordered   10  Metamucil POWD; TWICE A DAY; Therapy: (Recorded:99Jle3468) to Recorded   11  MiraLax Oral Powder (Polyethylene Glycol 3350); take 17GM (DISSOLVED IN WATER    OR JUICE) by mouth once daily; Therapy: 16OOE8752 to (Evaluate:20Wio7638) Recorded   12  Oxycodone-Acetaminophen  MG Oral Tablet; TAKE 1 TABLET 4 times daily PRN    pain; Therapy: 05Apr2016 to (Evaluate:69Csn2393); Last Rx:14Jun2016 Ordered   13  PredniSONE 10 MG Oral Tablet; TAKE 4 TABLETS DAILY FOR 3 DAYS,3 TABLETS    DAILY FOR 3 DAYS, 2 TABLETS DAILY FOR 3 DAYS AND 1 TABLET DAILY FOR    3 DAYS, THEN STOP; Therapy: 36YFC0023 to (Evaluate:09Jun2016)  Requested for: 61RNA4061; Last    NI:42VHU6005 Ordered   14   PredniSONE 5 MG Oral Tablet; take 1 tablet by mouth daily; Therapy: 70MVZ7112 to (Evaluate:20Apr2016)  Requested for: 89DCR7919; Last    Rx:21Mar2016 Ordered   15  Symbicort 160-4 5 MCG/ACT Inhalation Aerosol; USE 2 INHALATIONS BY MOUTH    TWICE DAILY*** RINSE MOUTH AFTER USE; Therapy: 18NXC4528 to (Evaluate:09Sow3064)  Requested for: 97MUK2146 Recorded   16  Ventolin  (90 Base) MCG/ACT Inhalation Aerosol Solution; USE 2 PUFFS BY    MOUTH EVERY 4 TO 6 HOURS AS NEEDED; Therapy: 61EWX8000 to (Evaluate:25Yxk1583)  Requested for: 35YOF4012; Last    Rx:30Blj0412 Ordered   17  Zolpidem Tartrate 10 MG Oral Tablet; TAKE 1 TABLET AT BEDTIME AS NEEDED; Therapy: 18DAY4110 to (Last Rx:03Fsf5538) Ordered    Allergies    1   No Known Drug Allergies    Signatures   Electronically signed by : Gogo Joy RN; Jun 21 2016  4:29PM EST                       (Author)

## 2018-01-10 NOTE — MISCELLANEOUS
History of Present Illness  COPD Hospital Discharge Follow-Up Home Visit: The patient is being seen at home for follow-up after hospitalization  The date of discharge is 8/8/16  He was readmitted in the last 30 days  The date of the home visit is 8/15/16  The patient was discharged to home with [address] [facility]  The inside home environment is clean, cluttered and comfortable  There are 3 people in the home  There are no sick people in the home  Family support is good  There are no loose carpets in the home  The number of stairs the patient must climb at home is 0  The types of assist devices used by the patient are cane  The following recommendations are made: BiPAP for chronic respiratory Failure, as used in the hospital   Sleep with wedges or slightly elevated for SOB  The patient appears clean, short of breath, anxious, immobile, sad/depressed and to be using pursed lip breathing  Zone status is yellow  The patient is experiencing the following symptoms: cough and dyspnea, but no wheezing, no fever and not coughing up sputum  Pulse oximetry 91% and with oxygen at 5 LPM  Oxygen is used all the time  The patient is using oxygen concentrator, home filling station and nebulizer  The DME provider was called on [employer]  The physician was called because [employer]  The following topics were reviewed:  cleaning concentrator filter, checking O2 tubing for kinks, proper length, small holes, water, breathing techniques, nebulizer use, review of DME equipment/cleaning, pulmonary rehab, energy conservation, physician follow-ups and medication compliance  Other teaching that was provided included: Patient is to be evaluated at [facility]  Dr Rudi Schirmer states the patient has sever bronchiectasis  Current Meds    1  ALPRAZolam 0 5 MG Oral Tablet; TAKE 1 TABLET EVERY 6 TO 8 HOURS AS NEEDED; Therapy: 60NNY2806 to (Evaluate:08Nda7312); Last Rx:91Fuh9714 Ordered    2   Ipratropium-Albuterol 0 5-2 5 (3) MG/3ML Inhalation Solution; USE 1 VIAL VIA   NEBULIZER EVERY 4 HOURS AS NEEDED; Therapy: 87LNB0879 to (Evaluate:00Tzz1821)  Requested for: 38Mbs5681; Last   Rx:63Udx5210 Ordered    3  Potassium Chloride ER 20 MEQ Oral Tablet Extended Release; TAKE 1 TABLET DAILY   X3 DAYS; Therapy: 50YIB8339 to (Last Rx:15Ufb1272)  Requested for: 66Pyu1013 Ordered    4  PredniSONE 5 MG Oral Tablet; take 1 tablet by mouth daily; Therapy: 33JYO3142 to (Evaluate:67Nej8196)  Requested for: 17FTA2737; Last   Rx:68Pxb9738 Ordered   5  Symbicort 160-4 5 MCG/ACT Inhalation Aerosol; USE 2 INHALATIONS BY MOUTH TWICE   DAILY*** RINSE MOUTH AFTER USE; Therapy: 77FNI1492 to (Evaluate:78Bqf0657)  Requested for: 35Djd5864; Last   Rx:36Pxy6522 Ordered   6  Ventolin  (90 Base) MCG/ACT Inhalation Aerosol Solution; USE 2 PUFFS BY   MOUTH EVERY 4 TO 6 HOURS AS NEEDED; Therapy: 19WQG6095 to (Evaluate:78Kgf8095)  Requested for: 77GGK7709; Last   Rx:81Jqe2601 Ordered    7  Lorzone 750 MG Oral Tablet; TAKE 1 TABLET 3 times daily PRN muscle spasms; Therapy: 17MPF2704 to (Evaluate:03Dvk1710)  Requested for: 92PGG7265; Last   Rx:95Bjs0695 Ordered    8  Oxycodone-Acetaminophen  MG Oral Tablet; TAKE 1 TABLET 4 times daily PRN   pain; Therapy: 41Zod9516 to (Evaluate:33Lnf4300); Last Rx:79Rtk8230 Ordered   9  OxyCONTIN 10 MG Oral Tablet ER 12 Hour Abuse-Deterrent; TAKE 1 TABLET EVERY 12   HOURS DAILY; Therapy: 76Had1535 to (Evaluate:89Dgc8717); Last Rx:42Bqv2709 Ordered    10  MiraLax Oral Powder (Polyethylene Glycol 3350); take 17GM (DISSOLVED IN WATER OR    JUICE) by mouth once daily; Therapy: 04SLG4807 to (Evaluate:57Vvq7398) Recorded    11  Furosemide 40 MG Oral Tablet; TAKE 1 TABLET TWICE DAILY; Therapy: 28Apr2016 to (Evaluate:15Qxc3156)  Requested for: 28Apr2016; Last    Rx:28Apr2016 Ordered    12  Lansoprazole 30 MG Oral Capsule Delayed Release; TAKE 1 CAPSULE Daily;     Therapy: (Recorded:03Apr2014) to Recorded    13  Zolpidem Tartrate 10 MG Oral Tablet; TAKE 1 TABLET AT BEDTIME AS NEEDED; Therapy: 49Jca1131 to (Last Rx:92Dph6496) Ordered    14  Diltiazem HCl  MG Oral Capsule Extended Release 24 Hour; TAKE ONE    CAPSULE BY MOUTH EVERY DAY; Therapy: 02XSQ0715 to (797 9386)  Requested for: 28Apr2016; Last    Rx:19Uro6347 Ordered   15  Eliquis 5 MG Oral Tablet; TWICE A DAY; Therapy: 65EBQ2736 to Recorded   16  Flecainide Acetate 100 MG Oral Tablet; take 1 tablet by mouth twice daily; Therapy: 74HAP1507 to (Evaluate:37Khs5997)  Requested for: 10Aug2016; Last    Rx:80Eea7026 Ordered    17  Calcium 335--512 Oral Tablet; tke one tab daily; Therapy: (Recorded:75Xen2540) to Recorded   18  Metamucil POWD; TWICE A DAY; Therapy: (Recorded:03Sop1848) to Recorded    Allergies    1  No Known Drug Allergies    Future Appointments    Date/Time Provider Specialty Site   10/06/2016 11:10 AM SARAH Hayward  Endocrinology [facility] ENDOCRINOLOGY [place] [facility]   08/26/2016 02:45 PM SARAH Regalado  Pain Management [facility] SPINE   09/21/2016 01:30 PM Stefanie Willis DO Pulmonary Medicine [address] PULMONARY [facility] [address] [place] [address]   09/19/2016 11:30 AM Rachel Lamb DO Family Medicine [facility] PRACTICE [facility]   08/25/2016 11:00 AM SARAH Fox  Cardiology [facility] CARDIOLOGY [address] [place] [address]   08/31/2016 09:20 AM Doris Noel [facility] Pulmonary Medicine [facility] PULMONARY [facility]     Signatures   Electronically signed by :  Paul Bertrand RT; Aug 22 2016  3:27PM EST                       (Author)

## 2018-01-11 NOTE — MISCELLANEOUS
Message   Recorded as Task   Date: 04/15/2016 09:15 AM, Created By: Via [employer] 17   Task Name: Care Coordination   Assigned To: [address] [facility] [facility] Clinical,Team   Regarding Patient: David Abdul, Status: Active   Comment:    Nettie Henriquez 17 - 15 Apr 2016 9:15 AM     TASK CREATED  Obtained results of DEXA scan  Patient has significant osteoporosis  Please inform patient and have patient make appointment with [facility] endocrinology  Melody Key - 18 Apr 2016 10:48 AM     TASK EDITED  Pt aware, requested the order to be mailed to his home  Active Problems    1  Abnormal chest CT (793 2) (R93 8)   2  Acinetobacter lwoffi infection (041 85) (A49 8)   3  Allergic rhinitis (477 9) (J30 9)   4  Benign essential hypertension (401 1) (I10)   5  Bronchiectasis (494 0) (J47 9)   6  Bronchiectasis with acute exacerbation (494 1) (J47 1)   7  Chest pain on breathing (786 52) (R07 1)   8  Chronic bronchitis (491 9) (J42)   9  Chronic narcotic dependence (304 91) (F11 20)   10  Chronic obstructive pulmonary disease (496) (J44 9)   11  Chronic pain syndrome (338 4) (G89 4)   12  Chronic respiratory failure with hypoxia (518 83,799 02) (J96 11)   13  Chronic thoracic spine pain (724 1,338 29) (M54 6,G89 29)   14  Cor pulmonale (416 9) (I27 81)   15  Cough (786 2) (R05)   16  Depression (311) (F32 9)   17  Edema (782 3) (R60 9)   18  Emphysema (492 8) (J43 9)   19  Encounter for long-term use of opiate analgesic (V58 69) (Z79 891)   20  Esophageal reflux (530 81) (K21 9)   21  Grief at loss of child (309 0) (F43 21,Z63 4)   22  Insomnia (780 52) (G47 00)   23  Lymphadenopathy, mediastinal (785 6) (R59 0)   24  Nonspecific abnormal findings on radiological and examination of lung field (793 19)    (R91 8)   25  Osteoporosis (733 00) (M81 0)   26  Pain, upper back (724 5) (M54 9)   27  Paroxysmal atrial fibrillation (427 31) (I48 0)   28  Pneumonia (486) (J18 9)   29   PSVT (paroxysmal supraventricular tachycardia) (427 0) (I47 1)   30  Pulmonary mycobacteria (031 0) (A31 0)   31  Pulmonary nodule seen on imaging study (793 11) (R91 1)   32  Rib pain (786 50) (R07 81)   33  Screening for colon cancer (V76 51) (Z12 11)   34  Shortness of breath (786 05) (R06 02)   35  Steroid dependent (V58 65)   36  Traumatic compression fracture of T6 thoracic vertebra (805 2) (S22 050A)   37  Ventricular bigeminy (427 89) (I49 9)   38  Wrist pain, right (719 43) (M25 531)    Current Meds   1  Calcium 273--931 Oral Tablet; tke one tab daily; Therapy: (Recorded:05Apr2016) to Recorded   2  Diltiazem HCl  MG Oral Capsule Extended Release 24 Hour; TAKE ONE   CAPSULE BY MOUTH EVERY DAY; Therapy: 31AIR0678 to (Balbina Sethi)  Requested for: 25HWC5217; Last   Rx:45Xpb1364 Ordered   3  Dulera 200-5 MCG/ACT Inhalation Aerosol; INHALE 2 PUFFS TWICE DAILY  RINSE   MOUTH AFTER USE; Therapy: 11MMJ2303 to (Evaluate:04Oct2016)  Requested for: 45KWX5970; Last   Rx:07Apr2016 Ordered   4  Flecainide Acetate 100 MG Oral Tablet; take 1 tablet by mouth twice daily; Therapy: 50PSJ3008 to (Evaluate:83Uiz7301)  Requested for: 81KKU5658; Last   Rx:17Jan2016 Ordered   5  Ipratropium-Albuterol 0 5-2 5 (3) MG/3ML Inhalation Solution; USE 1 VIAL VIA   NEBULIZER EVERY 4 HOURS AS NEEDED; Therapy: 50JRH9301 to (Evaluate:20Jan2016)  Requested for: 56Iqw7788; Last   Rx:67Zso7309 Ordered   6  Lansoprazole 30 MG Oral Capsule Delayed Release; TAKE 1 CAPSULE Daily; Therapy: (Recorded:27Hyy2680) to Recorded   7  Metamucil POWD; TWICE A DAY; Therapy: (Recorded:75Qyg1155) to Recorded   8  Nebulizer/Tubing/Mouthpiece KIT; USE AS DIRECTED; Therapy: 98MIV7990 to (Last Rx:79Zom9401) Ordered   9  Oxycodone-Acetaminophen 5-325 MG Oral Tablet; TAKE 1 TABLET 3 times daily PRN   pain; Therapy: 05Apr2016 to (Evaluate:22Xdy4147); Last Rx:05Apr2016 Ordered   10  OxyCONTIN 10 MG Oral Tablet ER 12 Hour Abuse-Deterrent; TAKE 1 TABLET EVERY    12 HOURS DAILY;     Therapy: 92QTE9919 to (Evaluate:30Ifx3541); Last Rx:05Apr2016 Ordered   11  PredniSONE 5 MG Oral Tablet; take 1 tablet by mouth daily; Therapy: 76QNH0926 to (Evaluate:20Apr2016)  Requested for: 41VPH2666; Last    Rx:21Mar2016 Ordered   12  Symbicort 160-4 5 MCG/ACT Inhalation Aerosol; USE 2 INHALATIONS BY MOUTH    TWICE DAILY*** RINSE MOUTH AFTER USE; Therapy: 71UVZ8460 to (Evaluate:56Che5226)  Requested for: 96EQO5140; Last    Rx:12Jan2016 Ordered   13  Ventolin  (90 Base) MCG/ACT Inhalation Aerosol Solution; USE 2 PUFFS BY    MOUTH EVERY 4 TO 6 HOURS AS NEEDED; Therapy: 56RHC6876 to (Evaluate:08Jur9103)  Requested for: 60YEU2178; Last    Rx:21Nov2014 Ordered   14  Zolpidem Tartrate 10 MG Oral Tablet; TAKE 1 TABLET AT BEDTIME AS NEEDED; Therapy: 05XRI4138 to (Last Rx:02Mar2016) Ordered    Allergies    1   No Known Drug Allergies    Signatures   Electronically signed by : Abad Abraham RN; Apr 18 2016 10:48AM EST                       (Author)

## 2018-01-12 VITALS
SYSTOLIC BLOOD PRESSURE: 102 MMHG | DIASTOLIC BLOOD PRESSURE: 70 MMHG | RESPIRATION RATE: 20 BRPM | HEART RATE: 80 BPM | TEMPERATURE: 98.4 F

## 2018-01-12 NOTE — MISCELLANEOUS
Message   Recorded as Task   Date: 03/15/2017 10:41 AM, Created By: Gurwinder Sainz   Task Name: Med Renewal Request   Assigned To: Micheal Dozier   Regarding Patient: Juan Antonio Colon, Status: Active   Comment:    Sana Neves - 15 Mar 2017 10:41 AM     TASK CREATED  Caller: Patient; Renew Medication  Can patient have a bridge RX for Oxycodone 10 mg to get him to his 3/29/ apt with you? Patient is taking 6 tablets a day both for pain and SOB  His SOB is so bad he thinkingof going to the hospital     Plan: Will leave a script for a 1 month supply #180  He can come to his scheduled appt or push it back  Plan  Chronic thoracic spine pain, Low back pain, Rib pain, Traumatic compression fracture of  T6 thoracic vertebra    · OxyCODONE HCl - 10 MG Oral Tablet; TAKE 1 TABLET EVERY 4-6 HOURS AS  NEEDED FOR BREAKTHROUGH PAIN    Signatures   Electronically signed by :  Lajean Schwab, M D ; Mar 15 2017 12:53PM EST                       (Author)

## 2018-01-12 NOTE — MISCELLANEOUS
Message   Recorded as Task   Date: 06/10/2016 09:58 AM, Created By: Francenia Sandhoff   Task Name: Med Renewal Request   Assigned To: [address] [facility], [address] [facility] Clinical,Team   Regarding Patient: Candelario Bui, Status: In Progress   Comment:    Melody Key - 10 Doug 2016 9:58 AM     TASK CREATED  Caller: Self; Renew Medication; [phone] (Home); [phone] (Mobile Phone)  Pt called requesting a refill of oxycodone  Informed pt that yLnda Kendall was out of the office and would return on monday  Pt stated that he had enough pxycodone until the end of the day on monday  Via [employer] 17 - 13 Jun 2016 8:44 AM     TASK REPLIED TO: Previously Assigned To [address] [place] [address] Road  Please ask him what he is taking currently  I asked him to take oxycodone/acetaminophen 10/325 mg TID PRN pain  This was switched in the middle of last month and he was asked to try it  Is he doing ok on this regimen? Tarun Weir - 13 Jun 2016 9:10 AM     TASK EDITED  Luz Elena Ayers with the patient regarding above  Pt states that the oxycodone/acetaminophen does work for the pt  Pt states he is unsure of how his pain medications will be adjusted as he has been admitted to [address] [place] [address] for CHF  Advised pt to call [facility] with updates when he is discharged  Pt verbalzes understanding  Via [employer] 17 - 13 Jun 2016 9:55 AM     TASK REPLIED TO: Previously Assigned To [place]  Thanks  Please ask patient to call back when he is discharged to adjust his medications with me  Tarun Weir - 13 Jun 2016 10:21 AM     TASK IN PROGRESS   Tarun Weir - 13 Jun 2016 10:22 AM     TASK EDITED  Patient aware to call [facility] post discharge  Active Problems    1  Abnormal chest CT (793 2) (R93 8)   2  Acinetobacter lwoffi infection (041 85) (A49 8)   3  Allergic rhinitis (477 9) (J30 9)   4  Anticoagulant long-term use (V58 61) (Z79 01)   5  Anxiety (300 00) (F41 9)   6  Benign essential hypertension (401 1) (I10)   7   Bronchiectasis (494 0) (J47 9)   8  Bronchiectasis with acute exacerbation (494 1) (J47 1)   9  Chest pain on breathing (786 52) (R07 1)   10  CHF (congestive heart failure) (428 0) (I50 9)   11  Chronic bronchitis (491 9) (J42)   12  Chronic narcotic dependence (304 91) (F11 20)   13  Chronic obstructive pulmonary disease (496) (J44 9)   14  Chronic pain (338 29) (G89 29)   15  Chronic pain syndrome (338 4) (G89 4)   16  Chronic respiratory failure with hypoxia (518 83,799 02) (J96 11)   17  Chronic thoracic spine pain (724 1,338 29) (M54 6,G89 29)   18  Constipation, unspecified constipation type (564 00) (K59 00)   19  Cor pulmonale (416 9) (I27 81)   20  Cough (786 2) (R05)   21  Depression (311) (F32 9)   22  Edema (782 3) (R60 9)   23  Emphysema (492 8) (J43 9)   24  Encounter for long-term use of opiate analgesic (V58 69) (Z79 891)   25  Esophageal reflux (530 81) (K21 9)   26  Grief at loss of child (309 0) (F43 21,Z63 4)   27  Insomnia (780 52) (G47 00)   28  Low back pain (724 2) (M54 5)   29  Lymphadenopathy, mediastinal (785 6) (R59 0)   30  Myofascial pain syndrome (729 1) (M79 1)   31  Nonspecific abnormal findings on radiological and examination of lung field (793 19)    (R91 8)   32  Osteoporosis (733 00) (M81 0)   33  Pain, upper back (724 5) (M54 9)   34  Paroxysmal atrial fibrillation (427 31) (I48 0)   35  Pneumonia (486) (J18 9)   36  PSVT (paroxysmal supraventricular tachycardia) (427 0) (I47 1)   37  Pulmonary mycobacteria (031 0) (A31 0)   38  Pulmonary nodule seen on imaging study (793 11) (R91 1)   39  Rib pain (786 50) (R07 81)   40  Screening for colon cancer (V76 51) (Z12 11)   41  Shortness of breath (786 05) (R06 02)   42  Steroid dependent (V58 65)   43  Traumatic compression fracture of T6 thoracic vertebra (805 2) (S22 050A)   44  Ventricular bigeminy (427 89) (I49 9)   45  Wrist pain, right (719 43) (M25 531)    Current Meds   1   ALPRAZolam 0 5 MG Oral Tablet; TAKE 1 TABLET EVERY 6 TO 8 HOURS AS NEEDED; Therapy: 24AGX8739 to (Evaluate:01Jun2016); Last Rx:17May2016 Ordered   2  Calcium 924--401 Oral Tablet; tke one tab daily; Therapy: (Recorded:05Apr2016) to Recorded   3  Diltiazem HCl  MG Oral Capsule Extended Release 24 Hour; TAKE ONE   CAPSULE BY MOUTH EVERY DAY; Therapy: 44RVA9018 to (22 655219)  Requested for: 28Apr2016; Last   Rx:28Apr2016 Ordered   4  Eliquis 5 MG Oral Tablet; TWICE A DAY; Therapy: 74MIH9717 to Recorded   5  Flecainide Acetate 100 MG Oral Tablet; take 1 tablet by mouth twice daily; Therapy: 86BOL0161 to (Evaluate:01Lhf8775)  Requested for: 72UUQ6781; Last   Rx:17Jan2016 Ordered   6  Furosemide 40 MG Oral Tablet; TAKE 1 TABLET TWICE DAILY; Therapy: 28Apr2016 to (Evaluate:21Wsw9577)  Requested for: 28Apr2016; Last   Rx:28Apr2016 Ordered   7  Ipratropium-Albuterol 0 5-2 5 (3) MG/3ML Inhalation Solution; USE 1 VIAL VIA   NEBULIZER EVERY 4 HOURS AS NEEDED; Therapy: 43KLR7347 to (Evaluate:24Jun2016)  Requested for: 75HGR2998; Last   Rx:41Brh4561 Ordered   8  Lansoprazole 30 MG Oral Capsule Delayed Release; TAKE 1 CAPSULE Daily; Therapy: (Recorded:03Apr2014) to Recorded   9  Lorzone 750 MG Oral Tablet; TAKE 1 TABLET 3 times daily PRN muscle spasms; Therapy: 67JOL1661 to (Evaluate:22Nhh7069)  Requested for: 59CSF3762; Last   Rx:07Jun2016 Ordered   10  Metamucil POWD; TWICE A DAY; Therapy: (Recorded:36Yar0978) to Recorded   11  MiraLax Oral Powder (Polyethylene Glycol 3350); take 17GM (DISSOLVED IN WATER    OR JUICE) by mouth once daily; Therapy: 38AHG5288 to (Evaluate:01Usf7285) Recorded   12  Oxycodone-Acetaminophen 5-325 MG Oral Tablet; TAKE 1 TABLET 3 times daily PRN    pain; Therapy: 53VGV3333 to (Evaluate:72Lqw9954); Last Rx:31May2016 Ordered   13  OxyCONTIN 10 MG Oral Tablet ER 12 Hour Abuse-Deterrent; TAKE 1 TABLET EVERY    12 HOURS DAILY; Therapy: 66GXT4523 to (Evaluate:67Hna8200); Last Rx:31May2016 Ordered   14   PredniSONE 10 MG Oral Tablet; TAKE 4 TABLETS DAILY FOR 3 DAYS,3 TABLETS    DAILY FOR 3 DAYS, 2 TABLETS DAILY FOR 3 DAYS AND 1 TABLET DAILY FOR    3 DAYS, THEN STOP; Therapy: 16HAJ5963 to (Evaluate:09Jun2016)  Requested for: 64HEE1793; Last    MR:16HAD4760 Ordered   15  PredniSONE 5 MG Oral Tablet; take 1 tablet by mouth daily; Therapy: 81FJR6045 to (Evaluate:20Apr2016)  Requested for: 39BWS3178; Last    Rx:21Mar2016 Ordered   16  Symbicort 160-4 5 MCG/ACT Inhalation Aerosol; USE 2 INHALATIONS BY MOUTH    TWICE DAILY*** RINSE MOUTH AFTER USE; Therapy: 50LEX6050 to (Evaluate:86Hhs8533)  Requested for: 00LLO5610 Recorded   17  Ventolin  (90 Base) MCG/ACT Inhalation Aerosol Solution; USE 2 PUFFS BY    MOUTH EVERY 4 TO 6 HOURS AS NEEDED; Therapy: 04COH4845 to (Evaluate:94Qqn9962)  Requested for: 85ZNI3094; Last    Rx:72Wim5302 Ordered   18  Zolpidem Tartrate 10 MG Oral Tablet; TAKE 1 TABLET AT BEDTIME AS NEEDED; Therapy: 41ZYA4202 to (Last Rx:61Rng6667) Ordered    Allergies    1   No Known Drug Allergies    Signatures   Electronically signed by : Bonita Morgan, ; Jun 13 2016 10:22AM EST                       (Author)

## 2018-01-12 NOTE — PROGRESS NOTES
History of Present Illness  Care Coordination Encounter Information:   Type of Encounter: Telephonic   Contact: Initial Contact        Care Coordination [facility] Nurse [facility]:   The reason for call is to discuss outreach for follow up/needed services  Patient discharged from [facility] on 3/28/16 and 4/1/16 with a diagnosis of shortness of breath and pneumonia  Patient did not have a G[facility]EL[facility] appointment  Called patient to assess progress and address any questions or concerns  Attempted to contact patient with two unsuccessful phone calls  Left messages on machine with my contact information to return call  Active Problems    1  Abnormal chest CT (793 2) (R93 8)   2  Acinetobacter lwoffi infection (041 85) (A49 8)   3  Allergic rhinitis (477 9) (J30 9)   4  Benign essential hypertension (401 1) (I10)   5  Bronchiectasis (494 0) (J47 9)   6  Bronchiectasis with acute exacerbation (494 1) (J47 1)   7  Chest pain on breathing (786 52) (R07 1)   8  Chronic bronchitis (491 9) (J42)   9  Chronic narcotic dependence (304 91) (F11 20)   10  Chronic obstructive pulmonary disease (496) (J44 9)   11  Chronic pain syndrome (338 4) (G89 4)   12  Chronic respiratory failure with hypoxia (518 83,799 02) (J96 11)   13  Chronic thoracic spine pain (724 1,338 29) (M54 6,G89 29)   14  Cor pulmonale (416 9) (I27 81)   15  Cough (786 2) (R05)   16  Depression (311) (F32 9)   17  Edema (782 3) (R60 9)   18  Emphysema (492 8) (J43 9)   19  Encounter for long-term use of opiate analgesic (V58 69) (Z79 891)   20  Esophageal reflux (530 81) (K21 9)   21  Grief at loss of child (309 0) (F43 21,Z63 4)   22  Insomnia (780 52) (G47 00)   23  Lymphadenopathy, mediastinal (785 6) (R59 0)   24  Nonspecific abnormal findings on radiological and examination of lung field (793 19)    (R91 8)   25  Osteoporosis (733 00) (M81 0)   26  Pain, upper back (724 5) (M54 9)   27  Paroxysmal atrial fibrillation (427 31) (I48 0)   28   Pneumonia (5) (J18 9) 29  PSVT (paroxysmal supraventricular tachycardia) (427 0) (I47 1)   30  Pulmonary mycobacteria (031 0) (A31 0)   31  Pulmonary nodule seen on imaging study (793 11) (R91 1)   32  Rib pain (786 50) (R07 81)   33  Screening for colon cancer (V76 51) (Z12 11)   34  Shortness of breath (786 05) (R06 02)   35  Steroid dependent (V58 65)   36  Traumatic compression fracture of T6 thoracic vertebra (805 2) (S22 050A)   37  Ventricular bigeminy (427 89) (I49 9)   38  Wrist pain, right (719 43) (M25 531)    Past Medical History    1  History of Closed Capitellar Fracture Of The Left Humerus Without Extension (812 44)   2  History of Closed Epiphyseal Fracture Of The Proximal End Of The Left Humerus   (812 09)   3  History of Closed Fracture Of The Left Medial Tibial Plateau (Schatzker 4) (823 00)   4  History of Closed Fracture Of The Left Tibia Intercondylar [place] With Anterior Edge   Elevation Elif Olivera And Calin 1) (823 00)   5  History of Hand pain, unspecified laterality   6  History of acute respiratory failure (V12 69) (Z87 09)   7  History of acute sinusitis (V12 69) (Z87 09)   8  History of bacterial pneumonia (V12 61) (Z87 01)   9  History of chronic obstructive lung disease (V12 69) (Z87 09)   10  History of fracture of humerus (V15 51) (Z87 81)   11  History of Paroxysmal atrial fibrillation (427 31) (I48 0)   12  Pneumonia (486) (J18 9)   13  History of Post Adult Respiratory Distress Syndrome (518 82)   14  History of Septic Shock (Pseudomonas) (785 59)    Surgical History    1  History of Cataract Surgery   2  History of Complete Colonoscopy   3  History of Femoral Hernia Repair   4  History of Inguinal Hernia Repair   5  History of Umbilical Hernia Repair    Family History  Mother    1  Family history of Emphysema  Father    2  Family history of Chronic Interstitial Lung Disease  Family History    3  Family history of Arthritis (V17 7)   4  Family history of Lung Cancer (V16 1)   5   Family history of Osteoporosis (V17 81)    Social History    · Denied: History of Alcohol Use (History)   · Caffeine Use   · Current Diet High In Sugar Includes High Sugar Beverages   · Daily Coffee Consumption (8  Cups/Day)   · Education - Highest Level Achieved (9  Years Completed)   · Former smoker (S00 19) (Z87 891)   · Marital History - Currently    · Occupation:   · Denied: History of Tobacco Use    Current Meds    1  Ipratropium-Albuterol 0 5-2 5 (3) MG/3ML Inhalation Solution; USE 1 VIAL VIA   NEBULIZER EVERY 4 HOURS AS NEEDED; Therapy: 93YOI4887 to (Evaluate:20Jan2016)  Requested for: 99Drb3066; Last   Rx:77Zkh4176 Ordered    2  Dulera 200-5 MCG/ACT Inhalation Aerosol; INHALE 2 PUFFS TWICE DAILY  RINSE   MOUTH AFTER USE; Therapy: 25JYJ4016 to (Evaluate:04Oct2016)  Requested for: 07Apr2016; Last   Rx:07Apr2016 Ordered   3  Nebulizer/Tubing/Mouthpiece KIT; USE AS DIRECTED; Therapy: 83CVK2798 to (Last Rx:96Qgt5355) Ordered   4  PredniSONE 5 MG Oral Tablet; take 1 tablet by mouth daily; Therapy: 51KTY8754 to (Evaluate:20Apr2016)  Requested for: 85GKV4589; Last   Rx:21Mar2016 Ordered   5  Symbicort 160-4 5 MCG/ACT Inhalation Aerosol; USE 2 INHALATIONS BY MOUTH TWICE   DAILY*** RINSE MOUTH AFTER USE; Therapy: 00CRY2506 to (Evaluate:46Uob4149)  Requested for: 10ZGT6073; Last   Rx:12Jan2016 Ordered   6  Ventolin  (90 Base) MCG/ACT Inhalation Aerosol Solution; USE 2 PUFFS BY   MOUTH EVERY 4 TO 6 HOURS AS NEEDED; Therapy: 06PRU2568 to (Evaluate:77Ccb1752)  Requested for: 57GQM8965; Last   Rx:21Nov2014 Ordered    7  Oxycodone-Acetaminophen 5-325 MG Oral Tablet; TAKE 1 TABLET 3 times daily PRN   pain; Therapy: 69Bao9157 to (Evaluate:31Gwb4331); Last Rx:05Apr2016 Ordered   8  OxyCONTIN 10 MG Oral Tablet ER 12 Hour Abuse-Deterrent; TAKE 1 TABLET EVERY 12   HOURS DAILY; Therapy: 14Efe0965 to (Evaluate:08May2016); Last Rx:05Apr2016 Ordered    9   Lansoprazole 30 MG Oral Capsule Delayed Release; TAKE 1 CAPSULE Daily; Therapy: (Recorded:03Apr2014) to Recorded    10  Zolpidem Tartrate 10 MG Oral Tablet; TAKE 1 TABLET AT BEDTIME AS NEEDED; Therapy: 73ZQQ7956 to (Last Rx:02Mar2016) Ordered    11  Diltiazem HCl  MG Oral Capsule Extended Release 24 Hour; TAKE ONE    CAPSULE BY MOUTH EVERY DAY; Therapy: 64FSQ6657 to (Akanksha Sung)  Requested for: 45NMU8260; Last    Rx:88Oho6925 Ordered   12  Flecainide Acetate 100 MG Oral Tablet; take 1 tablet by mouth twice daily; Therapy: 22LQD9504 to (Evaluate:68Frc5117)  Requested for: 60MUR8476; Last    Rx:17Jan2016 Ordered    13  Calcium 871--177 Oral Tablet; tke one tab daily; Therapy: (Recorded:05Apr2016) to Recorded   14  Metamucil POWD; TWICE A DAY; Therapy: (Recorded:07Qde9201) to Recorded    Allergies    1  No Known Drug Allergies    Health Management   COLONOSCOPY; every 10 years; Next Due: 12DHM2609; Overdue    End of Encounter Meds    1  Ipratropium-Albuterol 0 5-2 5 (3) MG/3ML Inhalation Solution; USE 1 VIAL VIA   NEBULIZER EVERY 4 HOURS AS NEEDED; Therapy: 25NLO0715 to (Evaluate:20Jan2016)  Requested for: 99Jzf5555; Last   Rx:80Iuf9880 Ordered    2  Dulera 200-5 MCG/ACT Inhalation Aerosol; INHALE 2 PUFFS TWICE DAILY  RINSE   MOUTH AFTER USE; Therapy: 01HHX5337 to (Evaluate:04Oct2016)  Requested for: 97Tah2594; Last   Rx:07Apr2016 Ordered   3  Nebulizer/Tubing/Mouthpiece KIT; USE AS DIRECTED; Therapy: 27KEN3622 to (Last Rx:02Rna1732) Ordered   4  PredniSONE 5 MG Oral Tablet; take 1 tablet by mouth daily; Therapy: 75SPC5529 to (Evaluate:20Apr2016)  Requested for: 92UQW5620; Last   Rx:21Mar2016 Ordered   5  Symbicort 160-4 5 MCG/ACT Inhalation Aerosol; USE 2 INHALATIONS BY MOUTH TWICE   DAILY*** RINSE MOUTH AFTER USE; Therapy: 86NGQ5794 to (Evaluate:04Qjp9651)  Requested for: 56RWU9092; Last   Rx:12Jan2016 Ordered   6   Ventolin  (90 Base) MCG/ACT Inhalation Aerosol Solution; USE 2 PUFFS BY   MOUTH EVERY 4 TO 6 HOURS AS NEEDED; Therapy: 71GCK1209 to (Evaluate:79Agu4455)  Requested for: 17NMD0418; Last   Rx:21Nov2014 Ordered    7  Oxycodone-Acetaminophen 5-325 MG Oral Tablet; TAKE 1 TABLET 3 times daily PRN   pain; Therapy: 05Apr2016 to (Evaluate:66Kwv0029); Last Rx:05Apr2016 Ordered   8  OxyCONTIN 10 MG Oral Tablet ER 12 Hour Abuse-Deterrent; TAKE 1 TABLET EVERY 12   HOURS DAILY; Therapy: 05Apr2016 to (Evaluate:03Yvi9201); Last Rx:05Apr2016 Ordered    9  Lansoprazole 30 MG Oral Capsule Delayed Release; TAKE 1 CAPSULE Daily; Therapy: (Recorded:03Apr2014) to Recorded    10  Zolpidem Tartrate 10 MG Oral Tablet; TAKE 1 TABLET AT BEDTIME AS NEEDED; Therapy: 09HGF2460 to (Last Rx:02Mar2016) Ordered    11  Diltiazem HCl  MG Oral Capsule Extended Release 24 Hour; TAKE ONE    CAPSULE BY MOUTH EVERY DAY; Therapy: 19NGN2551 to (Heidi Wiggins)  Requested for: 70LVD7140; Last    Rx:06Oct2015 Ordered   12  Flecainide Acetate 100 MG Oral Tablet; take 1 tablet by mouth twice daily; Therapy: 58KIS3639 to (Evaluate:83Eie4764)  Requested for: 05VHU8458; Last    Rx:58Ecs2462 Ordered    13  Calcium 144--779 Oral Tablet; tke one tab daily; Therapy: (Recorded:05Apr2016) to Recorded   14  Metamucil POWD; TWICE A DAY; Therapy: (Recorded:30Qzf1748) to Recorded    Future Appointments    Date/Time Provider Specialty Site   05/03/2016 08:00 AM Via SARAH Glass  Pain Management [facility]   04/27/2016 02:45 PM SARAH Guy  Orthopedic Surgery [facility] ORTHO SPECIALIST [place]   05/18/2016 01:30 PM Shantelle Graves DO Pulmonary Medicine  [address]     Message   Recorded as Task   Date: 04/07/2016 02:30 PM, Created By: Mely Simeon   Task Name: Call Back   Assigned To: Meyl Simeon   Regarding Patient: Debi Banerjee, Status: In Progress   Lavern Acosta - 07 Apr 2016 2:30 PM     TASK CREATED  Patient discharged from [facility] on 4/1/16   Called patient to assess progress and address any questions or concerns  There was no answer  Left message on machine with my contact information to return call  Kalyani Braga - 07 Apr 2016 2:30 PM     TASK IN PROGRESS   Kalyani Braga - 12 Apr 2016 11:02 AM     TASK EDITED  Called patient for the second attempt at this time  There was no answer  Left message on machine with my contact information to return call  Patient Care Team    Care Team Member Role Specialty Office Number   Edis DALEY  Specialist Pulmonary Medicine [phone] [facility] [address] Specialist Pulmonary Medicine [phone]   Nino DALEY  Orthopedic Surgery [phone]   Ozzy DALEY  Family Medicine [phone]   Sharyle Schlichter M D  Orthopedic Surgery [phone]   Handy Aguilar MD  Cardiology [phone]   Via Martinez DALEY    Pain Management [phone]     Signatures   Electronically signed by : Meagan Villarreal; Apr 13 2016  8:20AM EST                       (Author)

## 2018-01-13 NOTE — MISCELLANEOUS
History of Present Illness  TCM Communication [facility]: The patient is being contacted for follow-up after hospitalization and appointment for 7/29/16 with Dr Vel Warner  PT has a f/u appointment with pulmonology but he is not sure of the appointment date because he was not able to look at his appointment card  Hospital records were reviewed  He was hospitalized at and [facility] internal medicine  The date of admission: 7/23/16, date of discharge: 7/25/16  Diagnosis: extensive COPD, Afib, lumbar vertebral fracture, chronic pain syndrom, DVT, CHF  He was discharged to home  He scheduled a follow up appointment  Follow-up appointments with other specialists: appointment with pulmonology, not able to provide date at the moment  Symptoms: weakness, fatigue, back pain left side and back pain right side  The patient is currently experiencing symptoms  Counseling was provided to the patient  Communication performed and completed by Pelon Crump   HPI: He called 7/29 and cancelled appt  Active Problems    1  Abnormal chest CT (793 2) (R93 8)   2  Acinetobacter lwoffi infection (041 85) (A49 8)   3  Acute deep vein thrombosis (DVT) of left lower extremity, unspecified vein (453 40)   (I82 402)   4  Allergic rhinitis (477 9) (J30 9)   5  Anticoagulant long-term use (V58 61) (Z79 01)   6  Anxiety (300 00) (F41 9)   7  Benign essential hypertension (401 1) (I10)   8  Bronchiectasis (494 0) (J47 9)   9  Bronchiectasis with acute exacerbation (494 1) (J47 1)   10  Chest pain on breathing (786 52) (R07 1)   11  CHF (congestive heart failure) (428 0) (I50 9)   12  Chronic bronchitis (491 9) (J42)   13  Chronic narcotic dependence (304 91) (F11 20)   14  Chronic obstructive pulmonary disease (496) (J44 9)   15  Chronic pain (338 29) (G89 29)   16  Chronic pain syndrome (338 4) (G89 4)   17  Chronic respiratory failure with hypoxia (518 83,799 02) (J96 11)   18  Chronic thoracic spine pain (724 1,338 29) (M54 6,G89 29)   19  Constipation, unspecified constipation type (564 00) (K59 00)   20  Cor pulmonale (416 9) (I27 81)   21  Cough (786 2) (R05)   22  Depression (311) (F32 9)   23  Edema (782 3) (R60 9)   24  Emphysema (492 8) (J43 9)   25  Encounter for long-term use of opiate analgesic (V58 69) (Z79 891)   26  Esophageal reflux (530 81) (K21 9)   27  Grief at loss of child (309 0) (F43 21,Z63 4)   28  Insomnia (780 52) (G47 00)   29  Low back pain (724 2) (M54 5)   30  Lymphadenopathy, mediastinal (785 6) (R59 0)   31  Myofascial pain syndrome (729 1) (M79 1)   32  Nonspecific abnormal findings on radiological and examination of lung field (793 19)    (R91 8)   33  Osteoporosis (733 00) (M81 0)   34  Pain, upper back (724 5) (M54 9)   35  Paroxysmal atrial fibrillation (427 31) (I48 0)   36  Pneumonia (486) (J18 9)   37  PSVT (paroxysmal supraventricular tachycardia) (427 0) (I47 1)   38  Pulmonary mycobacteria (031 0) (A31 0)   39  Pulmonary nodule seen on imaging study (793 11) (R91 1)   40  Rib pain (786 50) (R07 81)   41  Screening for colon cancer (V76 51) (Z12 11)   42  Shortness of breath (786 05) (R06 02)   43  Steroid dependent (V58 65)   44  Traumatic compression fracture of T6 thoracic vertebra (805 2) (S22 050A)   45  Ventricular bigeminy (427 89) (I49 9)   46  Wrist pain, right (719 43) (M25 531)    Past Medical History    1  History of Closed Capitellar Fracture Of The Left Humerus Without Extension (812 44)   2  History of Closed Epiphyseal Fracture Of The Proximal End Of The Left Humerus   (812 09)   3  History of Closed Fracture Of The Left Medial Tibial Plateau (Gabe 4) (823 00)   4  History of Closed Fracture Of The Left Tibia Intercondylar [place] With Anterior Edge   Elevation Chastity Patrick 1) (823 00)   5  History of Hand pain, unspecified laterality   6  History of acute respiratory failure (V12 69) (Z87 09)   7  History of acute sinusitis (V12 69) (Z87 09)   8   History of bacterial pneumonia (V12 61) (Z87 01)   9  History of chronic obstructive lung disease (V12 69) (Z87 09)   10  History of fracture of humerus (V15 51) (Z87 81)   11  History of Paroxysmal atrial fibrillation (427 31) (I48 0)   12  Pneumonia (486) (J18 9)   13  History of Post Adult Respiratory Distress Syndrome (518 82)   14  History of Septic Shock (Pseudomonas) (785 59)    Surgical History    1  History of Cataract Surgery   2  History of Complete Colonoscopy   3  History of Femoral Hernia Repair   4  History of Inguinal Hernia Repair   5  History of Umbilical Hernia Repair    Family History  Mother    1  Family history of Emphysema  Father    2  Family history of Chronic Interstitial Lung Disease  Family History    3  Family history of Arthritis (V17 7)   4  Family history of Lung Cancer (V16 1)   5  Family history of Osteoporosis (V17 81)    Social History    · Denied: History of Alcohol Use (History)   · Caffeine Use   · Current Diet High In Sugar Includes High Sugar Beverages   · Daily Coffee Consumption (8  Cups/Day)   · Education - Highest Level Achieved (9  Years Completed)   · Former smoker (O77 99) (Z87 891)   · Marital History - Currently    · Occupation:   · Denied: History of Tobacco Use    Current Meds   1  ALPRAZolam 0 5 MG Oral Tablet; TAKE 1 TABLET EVERY 6 TO 8 HOURS AS NEEDED; Therapy: 83LBH9103 to (Evaluate:02Ulg4652); Last Rx:24Vyy3256 Ordered   2  Calcium 634--060 Oral Tablet; tke one tab daily; Therapy: (Recorded:05Apr2016) to Recorded   3  Diltiazem HCl  MG Oral Capsule Extended Release 24 Hour; TAKE ONE   CAPSULE BY MOUTH EVERY DAY; Therapy: 28APO0542 to ([phone])  Requested for: 28Apr2016; Last   Rx:28Apr2016 Ordered   4  Eliquis 5 MG Oral Tablet; TWICE A DAY; Therapy: 09PZJ2943 to Recorded   5  Flecainide Acetate 100 MG Oral Tablet; take 1 tablet by mouth twice daily; Therapy: 77XVD5267 to (Evaluate:26Dac0447)  Requested for: 69GSQ2627; Last   Rx:17Jan2016 Ordered   6  Furosemide 40 MG Oral Tablet; TAKE 1 TABLET TWICE DAILY; Therapy: 28Apr2016 to (Evaluate:02Hgf7969)  Requested for: 28Apr2016; Last   Rx:28Apr2016 Ordered   7  Ipratropium-Albuterol 0 5-2 5 (3) MG/3ML Inhalation Solution; USE 1 VIAL VIA   NEBULIZER EVERY 4 HOURS AS NEEDED; Therapy: 25TGG9700 to (Evaluate:91Iye1802)  Requested for: 46Zjp4595; Last   Rx:70Wsx9794 Ordered   8  Lansoprazole 30 MG Oral Capsule Delayed Release; TAKE 1 CAPSULE Daily; Therapy: (Recorded:76Zjp5434) to Recorded   9  Lorzone 750 MG Oral Tablet; TAKE 1 TABLET 3 times daily PRN muscle spasms; Therapy: 04BWX3229 to (Evaluate:08Ygg1013)  Requested for: 87BLC9200; Last   Rx:05Lam5216 Ordered   10  Metamucil POWD; TWICE A DAY; Therapy: (Recorded:20Lls9877) to Recorded   11  MiraLax Oral Powder; take 17GM (DISSOLVED IN WATER OR JUICE) by mouth once    daily; Therapy: 40SNG0070 to (Evaluate:73Bda7230) Recorded   12  Oxycodone-Acetaminophen  MG Oral Tablet; TAKE 1 TABLET 4 times daily PRN    pain; Therapy: 05Apr2016 to (Evaluate:54Mao6115); Last Rx:86Xer2111 Ordered   13  OxyCONTIN 10 MG Oral Tablet ER 12 Hour Abuse-Deterrent; TAKE 1 TABLET EVERY 12    HOURS DAILY; Therapy: 05Apr2016 to (Evaluate:24Aen6387); Last Rx:09Akd8114 Ordered   14  PredniSONE 10 MG Oral Tablet; Take 4 QD x 5 days then 3 QD x 5 days then 2 QD    x 5 days then 1 daily; Therapy: 03AEP9245 to (Cam Squibb)  Requested for: 78LBY7511; Last    Rx:29Jun2016 Ordered   15  PredniSONE 5 MG Oral Tablet; take 1 tablet by mouth daily; Therapy: 17MGP8263 to (Evaluate:20Apr2016)  Requested for: 33HBO4814; Last    Rx:21Mar2016 Ordered   16  Symbicort 160-4 5 MCG/ACT Inhalation Aerosol; USE 2 INHALATIONS BY MOUTH TWICE    DAILY*** RINSE MOUTH AFTER USE; Therapy: 33VKM1555 to (Evaluate:23Hap2578)  Requested for: 64XIR9430 Recorded   17   Ventolin  (90 Base) MCG/ACT Inhalation Aerosol Solution; USE 2 PUFFS BY    MOUTH EVERY 4 TO 6 HOURS AS NEEDED; Therapy: 25JJQ6638 to (Evaluate:54Qvi8544)  Requested for: 53XLH8875; Last    Rx:92Jis7555 Ordered   18  Zolpidem Tartrate 10 MG Oral Tablet; TAKE 1 TABLET AT BEDTIME AS NEEDED; Therapy: 63ZMT6745 to (Last Rx:63Yty9133) Ordered    Allergies    1  No Known Drug Allergies    Health Management  Screening for colon cancer   COLONOSCOPY; every 10 years; Next Due: 74ZFG8694; Overdue    Message   Recorded as Task  Date: 07/25/2016 05:44 PM, Created By: System  Task Name: Lolis Glover  Assigned To: Idalmis Jimenes  Regarding Patient: Jana Patel, Status: In Progress  Comment:   System - 25 Jul 2016 5:44 PM    Patient discharged from hospital   Patient Name: Es Bartholomew  Patient YOB: 1960  Discharge Date: 7/25/2016  Facility: [facility] 27 Jul 2016 9:58 AM    TASK IN PROGRESS     Future Appointments    Date/Time Provider Specialty Site   08/03/2016 11:10 AM EULOGIO Stearns [facility]   08/02/2016 08:15 AM Via SARAH Glass  Pain Management [facility] SP[facility]   08/22/2016 08:50 AM SARAH Link  Orthopedic Surgery [facility] [address] [place] [address]   09/21/2016 01:30 PM Stahlnecker, Booker Moritz, DO Pulmonary Medicine [facility]   09/19/2016 11:30 AM Nile Estes DO Family Medicine FAMILY [facility]   08/25/2016 11:00 AM SARAH Alonso   Cardiology [address]     Signatures   Electronically signed by : SARAH Buck ; Jul 29 2016  7:45AM EST                       (Author)

## 2018-01-14 VITALS
RESPIRATION RATE: 24 BRPM | BODY MASS INDEX: 33.29 KG/M2 | HEIGHT: 64 IN | OXYGEN SATURATION: 84 % | TEMPERATURE: 98.4 F | WEIGHT: 195 LBS | SYSTOLIC BLOOD PRESSURE: 148 MMHG | HEART RATE: 88 BPM | DIASTOLIC BLOOD PRESSURE: 80 MMHG

## 2018-01-14 NOTE — MISCELLANEOUS
Message   Recorded as Task   Date: 10/11/2016 08:46 AM, Created By: Irvin Herrera   Task Name: Miscellaneous   Assigned To: Irvin Herrera   Regarding Patient: Michel Gale, Status: Active   CommentArva Lennox - 11 Oct 2016 8:46 AM     TASK CREATED  Patient's wife called the answering service to cancel his appt  10/11/16 @ 8:30 AM   the patient is in the hospital   She requested the office call her to reschedule this appt  Amber Baron - 11 Oct 2016 8:55 AM     TASK REPLIED TO: Previously Assigned To [place]  Thanks  Active Problems    1  Abnormal chest CT (793 2) (R93 8)   2  Acinetobacter lwoffi infection (041 85) (A49 8)   3  Acute deep vein thrombosis (DVT) of left lower extremity, unspecified vein (453 40)   (I82 402)   4  Allergic rhinitis (477 9) (J30 9)   5  Anticoagulant long-term use (V58 61) (Z79 01)   6  Anxiety (300 00) (F41 9)   7  Benign essential hypertension (401 1) (I10)   8  Bronchiectasis (494 0) (J47 9)   9  Bronchiectasis with acute exacerbation (494 1) (J47 1)   10  Chest pain on breathing (786 52) (R07 1)   11  CHF (congestive heart failure) (428 0) (I50 9)   12  Chronic bronchitis (491 9) (J42)   13  Chronic narcotic dependence (304 91) (F11 20)   14  Chronic obstructive pulmonary disease (496) (J44 9)   15  Chronic pain (338 29) (G89 29)   16  Chronic pain syndrome (338 4) (G89 4)   17  Chronic respiratory failure with hypoxia (518 83,799 02) (J96 11)   18  Chronic thoracic spine pain (724 1,338 29) (M54 6,G89 29)   19  Constipation, unspecified constipation type (564 00) (K59 00)   20  Cor pulmonale (416 9) (I27 81)   21  Cough (786 2) (R05)   22  Depression (311) (F32 9)   23  Edema (782 3) (R60 9)   24  Emphysema (492 8) (J43 9)   25  Encounter for long-term use of opiate analgesic (V58 69) (Z79 891)   26  Esophageal reflux (530 81) (K21 9)   27  Grief at loss of child (309 0) (F43 21,Z63 4)   28  Insomnia (780 52) (G47 00)   29   Low back pain (724 2) (M54 5) 30  Lymphadenopathy, mediastinal (785 6) (R59 0)   31  Myofascial pain syndrome (729 1) (M79 1)   32  Nonspecific abnormal findings on radiological and examination of lung field (793 19)    (R91 8)   33  Osteoporosis (733 00) (M81 0)   34  Pain, upper back (724 5) (M54 9)   35  Paroxysmal atrial fibrillation (427 31) (I48 0)   36  Pneumonia (486) (J18 9)   37  PSVT (paroxysmal supraventricular tachycardia) (427 0) (I47 1)   38  Pulmonary mycobacteria (031 0) (A31 0)   39  Pulmonary nodule seen on imaging study (793 11) (R91 1)   40  Rib pain (786 50) (R07 81)   41  Screening for colon cancer (V76 51) (Z12 11)   42  Shortness of breath (786 05) (R06 02)   43  Steroid dependent (V58 65)   44  Traumatic compression fracture of T6 thoracic vertebra (805 2) (S22 050A)   45  Ventricular bigeminy (427 89) (I49 9)   46  Wrist pain, right (719 43) (M25 531)    Current Meds   1  ALPRAZolam 0 5 MG Oral Tablet; TAKE 1 TABLET EVERY 6 TO 8 HOURS AS NEEDED; Therapy: 13GHZ3909 to (Evaluate:23Ink9242); Last Rx:73Cih0904 Ordered   2  Calcium 956--750 Oral Tablet; tke one tab daily; Therapy: (Recorded:12Wsc3071) to Recorded   3  Diltiazem HCl  MG Oral Capsule Extended Release 24 Hour; TAKE ONE   CAPSULE BY MOUTH EVERY DAY; Therapy: 24LHK1582 to ([phone])  Requested for: 28Apr2016; Last   Rx:28Apr2016 Ordered   4  Eliquis 5 MG Oral Tablet; TWICE A DAY; Therapy: 55LOV1398 to Recorded   5  Flecainide Acetate 100 MG Oral Tablet; take 1 tablet by mouth twice daily; Therapy: 42DVY0339 to (Evaluate:54Omm1955)  Requested for: 10Aug2016; Last   Rx:10Aug2016 Ordered   6  Furosemide 40 MG Oral Tablet; TAKE 1 TABLET TWICE DAILY; Therapy: 91Ccw0605 to (Evaluate:33Hhl9797)  Requested for: 28Apr2016; Last   Rx:90Wlx3557 Ordered   7  Ipratropium-Albuterol 0 5-2 5 (3) MG/3ML Inhalation Solution; USE 1 VIAL VIA   NEBULIZER EVERY 4 HOURS AS NEEDED;    Therapy: 14EAJ8612 to (Evaluate:21Oct2016)  Requested for: 22BZZ8326; Last   Rx:96Kko6302 Ordered   8  Lansoprazole 30 MG Oral Capsule Delayed Release; TAKE 1 CAPSULE Daily; Therapy: (Recorded:55Ijq4528) to Recorded   9  Lorzone 750 MG Oral Tablet; TAKE 1 TABLET 3 times daily PRN muscle spasms; Therapy: 27LCY1952 to (Evaluate:35Njg4025)  Requested for: 10XGT4196; Last   Rx:38Nvb0388 Ordered   10  Metamucil POWD; TWICE A DAY; Therapy: (Recorded:06Pah3447) to Recorded   11  MiraLax Oral Powder (Polyethylene Glycol 3350); take 17GM (DISSOLVED IN WATER    OR JUICE) by mouth once daily; Therapy: 54VPJ8657 to (Evaluate:23Fua0222) Recorded   12  Oxycodone-Acetaminophen  MG Oral Tablet; TAKE 1 TABLET 4 times daily PRN    pain; Therapy: 04Jzp2159 to (Evaluate:53Uoo5947); Last Rx:22Cpr9146 Ordered   13  OxyCONTIN 10 MG Oral Tablet ER 12 Hour Abuse-Deterrent; TAKE 1 TABLET EVERY    12 HOURS DAILY; Therapy: 87Nmk9381 to (Evaluate:09Enu7528); Last Rx:84Jwj4607 Ordered   14  Potassium Chloride ER 20 MEQ Oral Tablet Extended Release; TAKE 1 TABLET DAILY    X3 DAYS; Therapy: 97TOE8556 to (Last Rx:29Gas9917)  Requested for: 30Byp0111 Ordered   15  PredniSONE 5 MG Oral Tablet; take 1 tablet by mouth daily; Therapy: 95SRA0123 to (Evaluate:74Kei4408)  Requested for: 78AQM7512; Last    Rx:21Mar2016 Ordered   16  Symbicort 160-4 5 MCG/ACT Inhalation Aerosol; USE 2 INHALATIONS BY MOUTH    TWICE DAILY*** RINSE MOUTH AFTER USE; Therapy: 85YHA8731 to (Evaluate:19Zqb9249)  Requested for: 85Qka9826; Last    Rx:90Ffv1299 Ordered   17  Ventolin  (90 Base) MCG/ACT Inhalation Aerosol Solution; USE 2 PUFFS BY    MOUTH EVERY 4 TO 6 HOURS AS NEEDED; Therapy: 80EXK4741 to (Evaluate:49Ncx2864)  Requested for: 96NAE2404; Last    Rx:51Apz5933 Ordered   18  Zolpidem Tartrate 10 MG Oral Tablet; TAKE 1 TABLET AT BEDTIME AS NEEDED; Therapy: 93Gjz7093 to (Last Rx:10Aug2016) Ordered    Allergies    1  No Known Drug Allergies    Signatures   Electronically signed by :  Mireille Galindo, ; Oct 11 2016  9:17AM EST                       (Author)

## 2018-01-14 NOTE — MISCELLANEOUS
Message   Recorded as Task   Date: 2016 08:32 AM, Created By: Almyra Apgar   Task Name: Med Renewal Request   Assigned To: Mary Linares   Regarding Patient: Savita Camp, Status: Active   Comment:    Sana Neves - 27 Dec 2016 8:32 AM     TASK CREATED  Caller: Silvina Ortega; Renew Medication  Patient has an appointment on 1/3/17 to see you ,but needs Oxycodone 10 mg to be picked up today  Plan  Chronic thoracic spine pain, Low back pain, Rib pain, Traumatic compression fracture of  T6 thoracic vertebra    · OxyCODONE HCl - 10 MG Oral Tablet; TAKE 1 TABLET EVERY 4-6 HOURS AS  NEEDED FOR BREAKTHROUGH PAIN    Signatures   Electronically signed by :  SARAH Aparicio ; Dec 27 2016 10:02AM EST                       (Author)

## 2018-01-15 NOTE — MISCELLANEOUS
Message  Per Dr Calixto Genera request, pt  has been scheduled at [facility]: [facility] for eval for lung transplant  His appt is scheduled for 9/19/16 with Dr Lori Fletcher  Active Problems    1  Abnormal chest CT (793 2) (R93 8)   2  Acinetobacter lwoffi infection (041 85) (A49 8)   3  Acute deep vein thrombosis (DVT) of left lower extremity, unspecified vein (453 40)   (I82 402)   4  Allergic rhinitis (477 9) (J30 9)   5  Anticoagulant long-term use (V58 61) (Z79 01)   6  Anxiety (300 00) (F41 9)   7  Benign essential hypertension (401 1) (I10)   8  Bronchiectasis (494 0) (J47 9)   9  Bronchiectasis with acute exacerbation (494 1) (J47 1)   10  Chest pain on breathing (786 52) (R07 1)   11  CHF (congestive heart failure) (428 0) (I50 9)   12  Chronic bronchitis (491 9) (J42)   13  Chronic narcotic dependence (304 91) (F11 20)   14  Chronic obstructive pulmonary disease (496) (J44 9)   15  Chronic pain (338 29) (G89 29)   16  Chronic pain syndrome (338 4) (G89 4)   17  Chronic respiratory failure with hypoxia (518 83,799 02) (J96 11)   18  Chronic thoracic spine pain (724 1,338 29) (M54 6,G89 29)   19  Constipation, unspecified constipation type (564 00) (K59 00)   20  Cor pulmonale (416 9) (I27 81)   21  Cough (786 2) (R05)   22  Depression (311) (F32 9)   23  Edema (782 3) (R60 9)   24  Emphysema (492 8) (J43 9)   25  Encounter for long-term use of opiate analgesic (V58 69) (Z79 891)   26  Esophageal reflux (530 81) (K21 9)   27  Grief at loss of child (309 0) (F43 21,Z63 4)   28  Insomnia (780 52) (G47 00)   29  Low back pain (724 2) (M54 5)   30  Lymphadenopathy, mediastinal (785 6) (R59 0)   31  Myofascial pain syndrome (729 1) (M79 1)   32  Nonspecific abnormal findings on radiological and examination of lung field (793 19)    (R91 8)   33  Osteoporosis (733 00) (M81 0)   34  Pain, upper back (724 5) (M54 9)   35  Paroxysmal atrial fibrillation (427 31) (I48 0)   36  Pneumonia (486) (J18 9)   37   PSVT (paroxysmal supraventricular tachycardia) (427 0) (I47 1)   38  Pulmonary mycobacteria (031 0) (A31 0)   39  Pulmonary nodule seen on imaging study (793 11) (R91 1)   40  Rib pain (786 50) (R07 81)   41  Screening for colon cancer (V76 51) (Z12 11)   42  Shortness of breath (786 05) (R06 02)   43  Steroid dependent (V58 65)   44  Traumatic compression fracture of T6 thoracic vertebra (805 2) (S22 050A)   45  Ventricular bigeminy (427 89) (I49 9)   46  Wrist pain, right (719 43) (M25 531)    Current Meds   1  ALPRAZolam 0 5 MG Oral Tablet; TAKE 1 TABLET EVERY 6 TO 8 HOURS AS NEEDED; Therapy: 69GCP5184 to (Evaluate:01Gih7774); Last Rx:93Hie8547 Ordered   2  Calcium 373--784 Oral Tablet; tke one tab daily; Therapy: (Recorded:05Apr2016) to Recorded   3  Diltiazem HCl  MG Oral Capsule Extended Release 24 Hour; TAKE ONE   CAPSULE BY MOUTH EVERY DAY; Therapy: 97ZHM9322 to [phone]  Requested for: 28Apr2016; Last   Rx:28Apr2016 Ordered   4  Eliquis 5 MG Oral Tablet; TWICE A DAY; Therapy: 92FPN7682 to Recorded   5  Flecainide Acetate 100 MG Oral Tablet; take 1 tablet by mouth twice daily; Therapy: 80ULT7261 to (Evaluate:52Eba2730)  Requested for: 09QLD6549; Last   Rx:17Jan2016 Ordered   6  Furosemide 40 MG Oral Tablet; TAKE 1 TABLET TWICE DAILY; Therapy: 38Xgn1455 to (Evaluate:00Pwb9840)  Requested for: 84Dyg1512; Last   Rx:28Apr2016 Ordered   7  Ipratropium-Albuterol 0 5-2 5 (3) MG/3ML Inhalation Solution; USE 1 VIAL VIA   NEBULIZER EVERY 4 HOURS AS NEEDED; Therapy: 81CWS3183 to (Evaluate:21Tsf3802)  Requested for: 21Iab6147; Last   Rx:63Wpb0656 Ordered   8  Lansoprazole 30 MG Oral Capsule Delayed Release; TAKE 1 CAPSULE Daily; Therapy: (Recorded:03Apr2014) to Recorded   9  Lorzone 750 MG Oral Tablet; TAKE 1 TABLET 3 times daily PRN muscle spasms; Therapy: 13BWC1200 to (Evaluate:19Otu7041)  Requested for: 39RRM8918; Last   Rx:13Vgu5910 Ordered   10  Metamucil POWD; TWICE A DAY;     Therapy: (Recorded:83Css3137) to Recorded   11  MiraLax Oral Powder (Polyethylene Glycol 3350); take 17GM (DISSOLVED IN WATER    OR JUICE) by mouth once daily; Therapy: 91RYM0420 to (Evaluate:56Ypt9448) Recorded   12  Oxycodone-Acetaminophen  MG Oral Tablet; TAKE 1 TABLET 4 times daily PRN    pain; Therapy: 65Inv9468 to (Evaluate:30Yyp7991); Last Rx:63Gul2635 Ordered   13  PredniSONE 10 MG Oral Tablet; Take 4 QD x 5 days then 3 QD x 5 days then 2 QD    x 5 days then 1 daily; Therapy: 62TNZ3779 to (Alfornia Essence)  Requested for: 59OGQ5252; Last    Rx:93Mum2166 Ordered   14  PredniSONE 5 MG Oral Tablet; take 1 tablet by mouth daily; Therapy: 60QWT7579 to (Evaluate:28Yqj9268)  Requested for: 32AUB5388; Last    Rx:49Wzj5002 Ordered   15  Symbicort 160-4 5 MCG/ACT Inhalation Aerosol; USE 2 INHALATIONS BY MOUTH    TWICE DAILY*** RINSE MOUTH AFTER USE; Therapy: 27GQJ9855 to (Evaluate:58Ttu4692)  Requested for: 35WVP7741 Recorded   16  Ventolin  (90 Base) MCG/ACT Inhalation Aerosol Solution; USE 2 PUFFS BY    MOUTH EVERY 4 TO 6 HOURS AS NEEDED; Therapy: 28OWL3376 to (Evaluate:57Dmx0837)  Requested for: 86XCD3275; Last    Rx:52Dxu4152 Ordered   17  Zolpidem Tartrate 10 MG Oral Tablet; TAKE 1 TABLET AT BEDTIME AS NEEDED; Therapy: 76PEX0321 to (Last Rx:70Hyf5960) Ordered    Allergies    1   No Known Drug Allergies    Signatures   Electronically signed by : Derek Farris, ; Jul 25 2016  2:08PM EST                       (Author)

## 2018-01-15 NOTE — MISCELLANEOUS
Provider Comments  Provider Comments:   FN SHOW      Signatures   Electronically signed by : SARAH Holbrook ; Oct  6 2016  1:07PM EST                       (Review)

## 2018-01-15 NOTE — MISCELLANEOUS
Message   Recorded as Task   Date: 02/12/2016 10:54 AM, Created By: Rex Frias   Task Name: Medical Complaint Callback   Assigned To: [address] [facility] [address] [facility] Clinical,Team   Regarding Patient: Laurent Hernandez, Status: In Progress   Adam Gata - 12 Feb 2016 10:54 AM     TASK CREATED  Caller: Self; Medical Complaint; [phone] (Home)  Received VM from pt  on [place] triage line from 906 am  Pt  states that he tried the pain medication that Dr Daysi Hdez prescribed  Pt  states he only took the medication once, did not help pain and made his breathing labored  Pt  states he will not take anymore of this medication until he hears back from Dr Daysi Hdez  Pt  requesting c/b at [phone]  **According to OV note from 2/9, it appears the pt  was started on Hysingla ER 20 mg  Please verify that is the medication pt  is referring to **   Melody Key - 12 Feb 2016 12:21 PM     TASK EDITED   [facility] - 12 Feb 2016 12:41 PM     TASK REPLIED TO: Previously Assigned To [facility]  The patient needs to try the medication for at least a week before calling it a failure  It takes a few days to fully get in his system  I am not sure why he is having labored breathing; opioid medications can cause slow breathing, not labored breathing  Melody Key - 12 Feb 2016 2:08 PM     TASK EDITED  Spoke to pt, aware of recommendations  Pt did take the medicine today and will continue to take it  I instructed pt to call back with an update early next week  Pt verbalized understanding and appreciative for the call  [facility] - 12 Feb 2016 2:14 PM     TASK REPLIED TO: Previously Assigned To [place]  Thanks  Stavropoulos,Melody - 12 Feb 2016 2:18 PM     TASK EDITED        Active Problems    1  Abnormal chest CT (793 2) (R93 8)   2  Acinetobacter lwoffi infection (041 85) (A49 8)   3  Allergic rhinitis (477 9) (J30 9)   4  Benign essential hypertension (401 1) (I10)   5   Bronchiectasis (494 0) (J47 9)   6  Bronchiectasis with acute exacerbation (494 1) (J47 1)   7  Chest pain on breathing (786 52) (R07 1)   8  Chronic bronchitis (491 9) (J42)   9  Chronic narcotic dependence (304 91) (F11 20)   10  Chronic obstructive pulmonary disease (496) (J44 9)   11  Chronic pain syndrome (338 4) (G89 4)   12  Chronic thoracic spine pain (724 1,338 29) (M54 6,G89 29)   13  Cor pulmonale (416 9) (I27 81)   14  Cough (786 2) (R05)   15  Edema (782 3) (R60 9)   16  Emphysema (492 8) (J43 9)   17  Encounter for long-term use of opiate analgesic (V58 69) (Z79 891)   18  Esophageal reflux (530 81) (K21 9)   19  Grief at loss of child (309 0) (F43 21,Z63 4)   20  Hypoxia (799 02) (R09 02)   21  Insomnia (780 52) (G47 00)   22  Lymphadenopathy, mediastinal (785 6) (R59 0)   23  Nonspecific abnormal findings on radiological and examination of lung field (793 19)    (R91 8)   24  Osteoporosis (733 00) (M81 0)   25  Pain, upper back (724 5) (M54 9)   26  Paroxysmal atrial fibrillation (427 31) (I48 0)   27  Pneumonia (486) (J18 9)   28  PSVT (paroxysmal supraventricular tachycardia) (427 0) (I47 1)   29  Pulmonary mycobacteria (031 0) (A31 0)   30  Pulmonary nodule seen on imaging study (793 11) (R91 1)   31  Rib pain (786 50) (R07 81)   32  Screening for colon cancer (V76 51) (Z12 11)   33  Shortness of breath (786 05) (R06 02)   34  Steroid dependent (V58 65)   35  Traumatic compression fracture of T6 thoracic vertebra (805 2) (S22 050A)   36  Ventricular bigeminy (427 89) (I49 9)   37  Wrist pain, right (719 43) (M25 531)    Current Meds   1  Aspirin  MG Oral Tablet Delayed Release; Take 1 tablet daily; Therapy: 29UVI8698 to (Evaluate:31Qgu4500) Recorded   2  Calcium TABS; Therapy: (Recorded:20Sww4605) to Recorded   3  Diltiazem HCl  MG Oral Capsule Extended Release 24 Hour; TAKE ONE   CAPSULE BY MOUTH EVERY DAY; Therapy: 64UIQ2733 to (Mj Gusman)  Requested for: 49BDG3832;  Last Rx: 61OYN0079 Ordered   4  Flecainide Acetate 100 MG Oral Tablet; take 1 tablet by mouth twice daily; Therapy: 45TSX6548 to (Evaluate:75Nrc4496)  Requested for: 45GMJ2082; Last   Rx:17Jan2016 Ordered   5  Forteo 600 MCG/2 4ML Subcutaneous Solution; INJECT 20 MCG  SUBCUTANEOUSLY   ONCE DAILY AS DIRECTED; Therapy: 37UZK8708 to (Evaluate:10Mar2016)  Requested for: 86CGY5588; Last   Rx:31Xji3678 Ordered   6  Furosemide 40 MG Oral Tablet; TAKE 1 TABLET DAILY; Therapy: 54HDS2239 to (Evaluate:87Psh1653)  Requested for: 12Jan2016; Last   Rx:12Jan2016 Ordered   7  Hysingla ER 20 MG Oral Tablet ER 24 Hour Abuse-Deterrent; Take 1 tablet daily; Therapy: 62VSC3818 to (Evaluate:10Mar2016); Last Rx:08Trb9520 Ordered   8  Ipratropium-Albuterol 0 5-2 5 (3) MG/3ML Inhalation Solution; USE 1 VIAL VIA   NEBULIZER EVERY 4 HOURS AS NEEDED; Therapy: 97FSS4452 to (Evaluate:20Jan2016)  Requested for: 24Esn6164; Last   Rx:42Rdd5704 Ordered   9  Lansoprazole 30 MG Oral Capsule Delayed Release; TAKE 1 CAPSULE Daily; Therapy: (Recorded:22Jxy7660) to Recorded   10  Metamucil POWD; TWICE A DAY; Therapy: (Recorded:73She5807) to Recorded   11  Nebulizer/Tubing/Mouthpiece KIT; USE AS DIRECTED; Therapy: 86KDZ7079 to (Last Rx:81Zwg2992) Ordered   12  PredniSONE 5 MG Oral Tablet; take 1 tablet by mouth daily; Therapy: 42FSY8436 to (Alisha Draft)  Requested for: 05OFF2476; Last    Rx:55Mzu3325 Ordered   13  Symbicort 160-4 5 MCG/ACT Inhalation Aerosol; USE 2 INHALATIONS BY MOUTH    TWICE DAILY*** RINSE MOUTH AFTER USE; Therapy: 01SQN9073 to (Evaluate:24Rme6801)  Requested for: 78YWO1429; Last    Rx:12Jan2016 Ordered   14  Ventolin  (90 Base) MCG/ACT Inhalation Aerosol Solution; USE 2 PUFFS BY    MOUTH EVERY 4 TO 6 HOURS AS NEEDED; Therapy: 56FMK8854 to (Evaluate:09Uhs1132)  Requested for: 38SME1851; Last    Rx:21Nov2014 Ordered   15   Zolpidem Tartrate 10 MG Oral Tablet; TAKE 1 TABLET AT BEDTIME AS NEEDED; Therapy: 95Mvw7724 to (Last Rx:41Psa5720) Ordered    Allergies    1   No Known Drug Allergies    Signatures   Electronically signed by : Alisson Del Cid RN; Feb 12 2016  2:18PM EST                       (Author)

## 2018-01-15 NOTE — MISCELLANEOUS
Message   Recorded as Task   Date: 03/02/2016 01:10 PM, Created By: Mary Ernandez   Task Name: Medical Complaint Callback   Assigned To: [address] [facility], [address] Clinical,Team   Regarding Patient: Saturnino Renner, Status: Active   Comment:    Keiko Dey - 02 Mar 2016 1:10 PM     TASK CREATED  Caller: Self; Medical Complaint; [phone] (Home)  Received VM from pt  on [facility] triage line from 1259 pm  Pt  states that the Tramadol is not working  Pt  requesting c/b at [phone]  Melody Key - 02 Mar 2016 3:09 PM     TASK EDITED  Spoke to pt, states that the tramadol makes his head & feet numb, and dizziness  It is not helping with his pain, only approx 10% improvement  He continues with the back & pleurisy pain  The tramadol helped better than the hysingla but not much per pt  [facility] - 02 Mar 2016 3:13 PM     TASK REPLIED TO: Previously Assigned To [facility]  Has he ever tried tylenol #3? Melody Key - 02 Mar 2016 3:31 PM     TASK EDITED  Pt has not tried it but is willing to try it  He uses [facility] in [facility] [employer]  [facility] - 02 Mar 2016 3:43 PM     TASK REPLIED TO: Previously Assigned To [facility]  Could you call in Tylenol #3 1 tab PO TID PRN pain #90? Melody Key - 02 Mar 2016 3:47 PM     TASK EDITED  Pt aware  Active Problems    1  Abnormal chest CT (793 2) (R93 8)   2  Acinetobacter lwoffi infection (041 85) (A49 8)   3  Allergic rhinitis (477 9) (J30 9)   4  Benign essential hypertension (401 1) (I10)   5  Bronchiectasis (494 0) (J47 9)   6  Bronchiectasis with acute exacerbation (494 1) (J47 1)   7  Chest pain on breathing (786 52) (R07 1)   8  Chronic bronchitis (491 9) (J42)   9  Chronic narcotic dependence (304 91) (F11 20)   10  Chronic obstructive pulmonary disease (496) (J44 9)   11  Chronic pain syndrome (338 4) (G89 4)   12  Chronic thoracic spine pain (724 1,338 29) (M54 6,G89 29)   13  Cor pulmonale (416 9) (I27 81)   14   Cough (786 2) (R05) 15  Depression (311) (F32 9)   16  Edema (782 3) (R60 9)   17  Emphysema (492 8) (J43 9)   18  Encounter for long-term use of opiate analgesic (V58 69) (Z79 891)   19  Esophageal reflux (530 81) (K21 9)   20  Grief at loss of child (309 0) (F43 21,Z63 4)   21  Hypoxia (799 02) (R09 02)   22  Insomnia (780 52) (G47 00)   23  Lymphadenopathy, mediastinal (785 6) (R59 0)   24  Nonspecific abnormal findings on radiological and examination of lung field (793 19)    (R91 8)   25  Osteoporosis (733 00) (M81 0)   26  Pain, upper back (724 5) (M54 9)   27  Paroxysmal atrial fibrillation (427 31) (I48 0)   28  Pneumonia (486) (J18 9)   29  PSVT (paroxysmal supraventricular tachycardia) (427 0) (I47 1)   30  Pulmonary mycobacteria (031 0) (A31 0)   31  Pulmonary nodule seen on imaging study (793 11) (R91 1)   32  Rib pain (786 50) (R07 81)   33  Screening for colon cancer (V76 51) (Z12 11)   34  Shortness of breath (786 05) (R06 02)   35  Steroid dependent (V58 65)   36  Traumatic compression fracture of T6 thoracic vertebra (805 2) (S22 050A)   37  Ventricular bigeminy (427 89) (I49 9)   38  Wrist pain, right (719 43) (M25 531)    Current Meds   1  Aspirin  MG Oral Tablet Delayed Release; Take 1 tablet daily; Therapy: 18ZUT7598 to (Evaluate:07Jan2016) Recorded   2  Calcium TABS; Therapy: (Recorded:62Fdy6569) to Recorded   3  Diltiazem HCl  MG Oral Capsule Extended Release 24 Hour; TAKE ONE   CAPSULE BY MOUTH EVERY DAY; Therapy: 95OIR8629 to (Pawel Cobia)  Requested for: 22TUM4760; Last   Rx:06Oct2015 Ordered   4  DULoxetine HCl - 30 MG Oral Capsule Delayed Release Particles; TAKE 1 CAPSULE   DAILY; Therapy: 64UQD5308 to (Evaluate:99Fzv9383)  Requested for: 15TCM2032; Last   Rx:62Iro4593 Ordered   5  Flecainide Acetate 100 MG Oral Tablet; take 1 tablet by mouth twice daily; Therapy: 12GXC2079 to (Evaluate:67Kxz5834)  Requested for: 98DHS9344; Last   Rx:17Jan2016 Ordered   6   Forteo 600 MCG/2 4ML Subcutaneous Solution; INJECT 20 MCG  SUBCUTANEOUSLY   ONCE DAILY AS DIRECTED; Therapy: 37TMI3066 to (Evaluate:10Mar2016)  Requested for: 28SJS0060; Last   Rx:53Jnp3376 Ordered   7  Furosemide 40 MG Oral Tablet; TAKE 1 TABLET DAILY; Therapy: 56NLK9851 to (Evaluate:66Ylj6249)  Requested for: 12Jan2016; Last   Rx:12Jan2016 Ordered   8  Ipratropium-Albuterol 0 5-2 5 (3) MG/3ML Inhalation Solution; USE 1 VIAL VIA   NEBULIZER EVERY 4 HOURS AS NEEDED; Therapy: 83CSM6917 to (Evaluate:20Jan2016)  Requested for: 55Uwm6817; Last   Rx:20Zyq8165 Ordered   9  Lansoprazole 30 MG Oral Capsule Delayed Release; TAKE 1 CAPSULE Daily; Therapy: (Recorded:03Apr2014) to Recorded   10  Metamucil POWD; TWICE A DAY; Therapy: (Recorded:66Rfa7839) to Recorded   11  Nebulizer/Tubing/Mouthpiece KIT; USE AS DIRECTED; Therapy: 83GRO1987 to (Last Rx:60Xho0655) Ordered   12  PredniSONE 5 MG Oral Tablet; take 1 tablet by mouth daily; Therapy: 16ZKM7032 to (Sj Alvarez)  Requested for: 45JYE2655; Last    Rx:30Qha6439 Ordered   13  Symbicort 160-4 5 MCG/ACT Inhalation Aerosol; USE 2 INHALATIONS BY MOUTH    TWICE DAILY*** RINSE MOUTH AFTER USE; Therapy: 10JRA4383 to (Evaluate:11Apr2016)  Requested for: 54MKW3327; Last    Rx:12Jan2016 Ordered   14  TraMADol HCl - 50 MG Oral Tablet; 1-2 TAB PO TID PRN PAIN;    Therapy: 86AQJ3711 to (Evaluate:24Mar2016); Last Rx:64Qzl1110 Ordered   15  Ventolin  (90 Base) MCG/ACT Inhalation Aerosol Solution; USE 2 PUFFS BY    MOUTH EVERY 4 TO 6 HOURS AS NEEDED; Therapy: 62PLL0529 to (Evaluate:81Lcb1669)  Requested for: 11PQL2714; Last    Rx:21Nov2014 Ordered   16  Zolpidem Tartrate 10 MG Oral Tablet; TAKE 1 TABLET AT BEDTIME AS NEEDED; Therapy: 24USD0193 to (Last Rx:02Mar2016) Ordered    Allergies    1   No Known Drug Allergies    Signatures   Electronically signed by : Veronica Martell RN; Mar  2 2016  3:47PM EST                       (Author)

## 2018-01-15 NOTE — MISCELLANEOUS
History of Present Illness  TCM Communication [facility]: The patient is being contacted for follow-up after hospitalization and not able to contact for a f/p pt in SNF  He was hospitalized at [address] [place] [address]  He was discharged to a nursing home, to a rehabilitation center  Medications reviewed and updated today  Referrals Needed:  N/A  Counseling was provided to  UNABLE  Communication performed and completed by CARLINE      Active Problems    1  Abnormal chest CT (793 2) (R93 8)   2  Acinetobacter lwoffi infection (041 85) (A49 8)   3  Acute deep vein thrombosis (DVT) of left lower extremity, unspecified vein (453 40)   (I82 402)   4  Allergic rhinitis (477 9) (J30 9)   5  Anticoagulant long-term use (V58 61) (Z79 01)   6  Anxiety (300 00) (F41 9)   7  Benign essential hypertension (401 1) (I10)   8  Bronchiectasis (494 0) (J47 9)   9  Bronchiectasis with acute exacerbation (494 1) (J47 1)   10  Chest pain on breathing (786 52) (R07 1)   11  CHF (congestive heart failure) (428 0) (I50 9)   12  Chronic bronchitis (491 9) (J42)   13  Chronic narcotic dependence (304 91) (F11 20)   14  Chronic obstructive pulmonary disease (496) (J44 9)   15  Chronic pain (338 29) (G89 29)   16  Chronic pain syndrome (338 4) (G89 4)   17  Chronic respiratory failure with hypoxia (518 83,799 02) (J96 11)   18  Chronic thoracic spine pain (724 1,338 29) (M54 6,G89 29)   19  Constipation, unspecified constipation type (564 00) (K59 00)   20  Cor pulmonale (416 9) (I27 81)   21  Cough (786 2) (R05)   22  Depression (311) (F32 9)   23  Edema (782 3) (R60 9)   24  Emphysema (492 8) (J43 9)   25  Encounter for long-term use of opiate analgesic (V58 69) (Z79 891)   26  Esophageal reflux (530 81) (K21 9)   27  Grief at loss of child (309 0) (F43 21,Z63 4)   28  Insomnia (780 52) (G47 00)   29  Low back pain (724 2) (M54 5)   30  Lymphadenopathy, mediastinal (785 6) (R59 0)   31  Myofascial pain syndrome (729 1) (M79 1)   32   Nonspecific abnormal findings on radiological and examination of lung field (793 19)    (R91 8)   33  Osteoporosis (733 00) (M81 0)   34  Pain, upper back (724 5) (M54 9)   35  Paroxysmal atrial fibrillation (427 31) (I48 0)   36  Pneumonia (486) (J18 9)   37  PSVT (paroxysmal supraventricular tachycardia) (427 0) (I47 1)   38  Pulmonary mycobacteria (031 0) (A31 0)   39  Pulmonary nodule seen on imaging study (793 11) (R91 1)   40  Rib pain (786 50) (R07 81)   41  Screening for colon cancer (V76 51) (Z12 11)   42  Shortness of breath (786 05) (R06 02)   43  Steroid dependent (V58 65)   44  Traumatic compression fracture of T6 thoracic vertebra (805 2) (S22 050A)   45  Ventricular bigeminy (427 89) (I49 9)   46  Wrist pain, right (719 43) (M25 531)    Past Medical History    1  History of Closed Capitellar Fracture Of The Left Humerus Without Extension (812 44)   2  History of Closed Epiphyseal Fracture Of The Proximal End Of The Left Humerus   (812 09)   3  History of Closed Fracture Of The Left Medial Tibial Plateau (Scharonnker 4) (823 00)   4  History of Closed Fracture Of The Left Tibia Intercondylar Manning With Anterior Edge   Elevation Lennie Martinez 1) (823 00)   5  History of Hand pain, unspecified laterality   6  History of acute respiratory failure (V12 69) (Z87 09)   7  History of acute sinusitis (V12 69) (Z87 09)   8  History of bacterial pneumonia (V12 61) (Z87 01)   9  History of chronic obstructive lung disease (V12 69) (Z87 09)   10  History of fracture of humerus (V15 51) (Z87 81)   11  History of Paroxysmal atrial fibrillation (427 31) (I48 0)   12  Pneumonia (486) (J18 9)   13  History of Post Adult Respiratory Distress Syndrome (518 82)   14  History of Septic Shock (Pseudomonas) (785 59)    Surgical History    1  History of Cataract Surgery   2  History of Complete Colonoscopy   3  History of Femoral Hernia Repair   4  History of Inguinal Hernia Repair   5   History of Umbilical Hernia Repair    Family History  Mother 1  Family history of Emphysema  Father    2  Family history of Chronic Interstitial Lung Disease  Family History    3  Family history of Arthritis (V17 7)   4  Family history of Lung Cancer (V16 1)   5  Family history of Osteoporosis (V17 81)    Social History    · Denied: History of Alcohol Use (History)   · Caffeine Use   · Current Diet High In Sugar Includes High Sugar Beverages   · Daily Coffee Consumption (8  Cups/Day)   · Education - Highest Level Achieved (9  Years Completed)   · Former smoker (F53 73) (Z87 321)   · Marital History - Currently    · Occupation:   · Denied: History of Tobacco Use    Current Meds   1  ALPRAZolam 0 5 MG Oral Tablet; TAKE 1 TABLET EVERY 6 TO 8 HOURS AS NEEDED; Therapy: 28XUN1796 to (Evaluate:05Oct2016); Last Rx:20Sep2016 Ordered   2  Calcium 292--240 Oral Tablet; tke one tab daily; Therapy: (Recorded:05Apr2016) to Recorded   3  Diltiazem HCl  MG Oral Capsule Extended Release 24 Hour; TAKE ONE   CAPSULE BY MOUTH EVERY DAY; Therapy: 45GCC3077 to ([id number])  Requested for: 28Apr2016; Last   Rx:28Apr2016 Ordered   4  Eliquis 5 MG Oral Tablet; TWICE A DAY; Therapy: 90WSP7698 to Recorded   5  Flecainide Acetate 100 MG Oral Tablet; take 1 tablet by mouth twice daily; Therapy: 55SCG6831 to (Evaluate:30Llp4456)  Requested for: 10Aug2016; Last   Rx:10Aug2016 Ordered   6  Furosemide 40 MG Oral Tablet; TAKE 1 TABLET TWICE DAILY; Therapy: 28Apr2016 to (Evaluate:30Gkr7800)  Requested for: 28Apr2016; Last   Rx:28Apr2016 Ordered   7  Ipratropium-Albuterol 0 5-2 5 (3) MG/3ML Inhalation Solution; USE 1 VIAL VIA   NEBULIZER EVERY 4 HOURS AS NEEDED; Therapy: 61PYV7170 to (Evaluate:21Oct2016)  Requested for: 72OSF9390; Last   Rx:12Jwd2841 Ordered   8  Lansoprazole 30 MG Oral Capsule Delayed Release; TAKE 1 CAPSULE Daily; Therapy: (Recorded:03Apr2014) to Recorded   9  Lorzone 750 MG Oral Tablet; TAKE 1 TABLET 3 times daily PRN muscle spasms;    Therapy: 53IAS7091 to (Evaluate:57Nsk5302)  Requested for: 83ZAB2699; Last   Rx:94Akr1592 Ordered   10  Metamucil POWD; TWICE A DAY; Therapy: (Recorded:40Wbf4238) to Recorded   11  MiraLax Oral Powder (Polyethylene Glycol 3350); take 17GM (DISSOLVED IN WATER OR    JUICE) by mouth once daily; Therapy: 89OSN9585 to (Evaluate:58Lcd8900) Recorded   12  Oxycodone-Acetaminophen  MG Oral Tablet; TAKE 1 TABLET 4 times daily PRN    pain; Therapy: 41Qbp4232 to (Evaluate:69Ztl4633); Last Rx:87Auz9007 Ordered   13  OxyCONTIN 10 MG Oral Tablet ER 12 Hour Abuse-Deterrent; TAKE 1 TABLET EVERY 12    HOURS DAILY; Therapy: 77Mpv6713 to (Evaluate:39Mxk2027); Last Rx:11Nde4951 Ordered   14  Potassium Chloride ER 20 MEQ Oral Tablet Extended Release; TAKE 1 TABLET DAILY    X3 DAYS; Therapy: 90VBB9631 to (Last Rx:50Jxf5912)  Requested for: 85Bml4491 Ordered   15  PredniSONE 5 MG Oral Tablet; take 1 tablet by mouth daily; Therapy: 44TIM5988 to (Evaluate:45Ipx0399)  Requested for: 07XOX7598; Last    Rx:29Pdu3782 Ordered   16  Symbicort 160-4 5 MCG/ACT Inhalation Aerosol; USE 2 INHALATIONS BY MOUTH TWICE    DAILY*** RINSE MOUTH AFTER USE; Therapy: 21BOS1060 to (Evaluate:06Dzg4182)  Requested for: 84Dsi9227; Last    Rx:96Jpk0118 Ordered   17  Ventolin  (90 Base) MCG/ACT Inhalation Aerosol Solution; USE 2 PUFFS BY    MOUTH EVERY 4 TO 6 HOURS AS NEEDED; Therapy: 41WQL3063 to (Evaluate:36Ysr1196)  Requested for: 78KKW2080; Last    Rx:69Fzd7840 Ordered   18  Zolpidem Tartrate 10 MG Oral Tablet; TAKE 1 TABLET AT BEDTIME AS NEEDED; Therapy: 86Yog7761 to (Last Rx:43Rww9535) Ordered    Allergies    1  No Known Drug Allergies    Health Management  Screening for colon cancer   COLONOSCOPY; every 10 years; Next Due: 77KEM0722; Overdue    Message   Recorded as Task   Date: 10/12/2016 02:02 PM, Created By: System   Task Name: Felipa Sensor   Assigned To: Kierra Allred   Regarding Patient: Sarina Moming, Status:  In Progress   Comment:    System - 12 Oct 2016 2:02 PM     Patient discharged from hospital   Patient Name: Héctor Simmons  Patient YOB: 1960  Discharge Date: 10/12/2016  Facility: [facility] 12 Oct 2016 3:34 PM     TASK EDITED                 left message on pt voicemail for wife to call back   Donnamarie Cue - 12 Oct 2016 3:34 PM     TASK IN PROGRESS   Recorded as Task   Date: 10/12/2016 02:02 PM, Created By: System   Task Name: Milton Sandoval   Assigned To: Tameka Life   Regarding Patient: Kaylie Dietrich, Status:  In Progress   Comment:    System - 12 Oct 2016 2:02 PM     Patient discharged from hospital   Patient Name: Héctor Simmons  Patient YOB: 1960  Discharge Date: 10/12/2016  Facility: [facility] 12 Oct 2016 3:34 PM     TASK EDITED                 left message on pt voicemail for wife to call back   Donnamarie Cue - 12 Oct 2016 3:34 PM     TASK IN PROGRESS     Signatures   Electronically signed by : Homero Nino DO; Nov 22 2016 11:16AM EST                       (Author)

## 2018-01-15 NOTE — RESULT NOTES
Verified Results  (1) BASIC METABOLIC PROFILE 93VTH4731 03:48PM [place] Comes     Test Name Result Flag Reference   GLUCOSE,RANDM 134 mg/dL     If the patient is fasting, the ADA then defines impaired fasting glucose as > 100 mg/dL and diabetes as > or equal to 123 mg/dL  SODIUM 137 mmol/L  136-145   POTASSIUM 3 7 mmol/L  3 5-5 3   CHLORIDE 95 mmol/L L 100-108   CARBON DIOXIDE 35 mmol/L H 21-32   ANION GAP (CALC) 7 mmol/L  4-13   BLOOD UREA NITROGEN 15 mg/dL  5-25   CREATININE 0 90 mg/dL  0 60-1 30   Standardized to IDMS reference method   CALCIUM 8 4 mg/dL  8 3-10 1   eGFR Non-African American      >60 0 ml/min/1 73sq [facility] [employer] Disease Education Program recommendations are as follows:  GFR calculation is accurate only with a steady state creatinine  Chronic Kidney disease less than 60 ml/min/1 73 sq  meters  Kidney failure less than 15 ml/min/1 73 sq  meters         Discussion/Summary   The sugar was a little elevated but I assume you weren't fasting for the test  Dr [place]

## 2018-01-15 NOTE — MISCELLANEOUS
Message   Recorded as Task   Date: 03/03/2016 01:52 PM, Created By: Shelley Yeung   Task Name: Follow Up   Assigned To: Shelley Yeung   Regarding Patient: Laurent Hernandez, Status: In Progress   Demetria Lack - 03 Mar 2016 1:52 PM     TASK CREATED  Caller: Self; General Medical Question; [phone] (Home)  FYI  Pt call stating that he was told he would be prescribed tylenol #3 but the prescription was note send to pharmacy  Reviewed noted indicating to call in prescription for tylenon #3  Medication [facility] [facility] pt pharmacy  Pt made aware  Pt advised to call back if experiencing any adverse effects or reaction to medication  [facility] [place] [facility] - 03 Mar 2016 1:58 PM     TASK REPLIED TO: Previously Assigned To Tarun Sparks - 91 Mar 2016 4:49 PM     TASK EDITED        Active Problems    1  Abnormal chest CT (793 2) (R93 8)   2  Acinetobacter lwoffi infection (041 85) (A49 8)   3  Allergic rhinitis (477 9) (J30 9)   4  Benign essential hypertension (401 1) (I10)   5  Bronchiectasis (494 0) (J47 9)   6  Bronchiectasis with acute exacerbation (494 1) (J47 1)   7  Chest pain on breathing (786 52) (R07 1)   8  Chronic bronchitis (491 9) (J42)   9  Chronic narcotic dependence (304 91) (F11 20)   10  Chronic obstructive pulmonary disease (496) (J44 9)   11  Chronic pain syndrome (338 4) (G89 4)   12  Chronic thoracic spine pain (724 1,338 29) (M54 6,G89 29)   13  Cor pulmonale (416 9) (I27 81)   14  Cough (786 2) (R05)   15  Depression (311) (F32 9)   16  Edema (782 3) (R60 9)   17  Emphysema (492 8) (J43 9)   18  Encounter for long-term use of opiate analgesic (V58 69) (Z79 891)   19  Esophageal reflux (530 81) (K21 9)   20  Grief at loss of child (309 0) (F43 21,Z63 4)   21  Hypoxia (799 02) (R09 02)   22  Insomnia (780 52) (G47 00)   23  Lymphadenopathy, mediastinal (785 6) (R59 0)   24   Nonspecific abnormal findings on radiological and examination of lung field (423 19) (R91 8)   25  Osteoporosis (733 00) (M81 0)   26  Pain, upper back (724 5) (M54 9)   27  Paroxysmal atrial fibrillation (427 31) (I48 0)   28  Pneumonia (486) (J18 9)   29  PSVT (paroxysmal supraventricular tachycardia) (427 0) (I47 1)   30  Pulmonary mycobacteria (031 0) (A31 0)   31  Pulmonary nodule seen on imaging study (793 11) (R91 1)   32  Rib pain (786 50) (R07 81)   33  Screening for colon cancer (V76 51) (Z12 11)   34  Shortness of breath (786 05) (R06 02)   35  Steroid dependent (V58 65)   36  Traumatic compression fracture of T6 thoracic vertebra (805 2) (S22 050A)   37  Ventricular bigeminy (427 89) (I49 9)   38  Wrist pain, right (719 43) (M25 531)    Current Meds   1  Aspirin  MG Oral Tablet Delayed Release; Take 1 tablet daily; Therapy: 36AFK0398 to (Evaluate:07Jan2016) Recorded   2  Calcium TABS; Therapy: (Recorded:79Zok6225) to Recorded   3  Diltiazem HCl  MG Oral Capsule Extended Release 24 Hour; TAKE ONE   CAPSULE BY MOUTH EVERY DAY; Therapy: 48DXR1055 to (Eliodoro Osler)  Requested for: 19IPL2599; Last   Rx:06Oct2015 Ordered   4  DULoxetine HCl - 30 MG Oral Capsule Delayed Release Particles; TAKE 1 CAPSULE   DAILY; Therapy: 11RJS8509 to (Evaluate:16Dro6057)  Requested for: 04EFU1641; Last   Rx:14Vmq8236 Ordered   5  Flecainide Acetate 100 MG Oral Tablet; take 1 tablet by mouth twice daily; Therapy: 11DKM6779 to (Evaluate:43Lms8643)  Requested for: 27VEE6278; Last   Rx:17Jan2016 Ordered   6  Forteo 600 MCG/2 4ML Subcutaneous Solution; INJECT 20 MCG  SUBCUTANEOUSLY   ONCE DAILY AS DIRECTED; Therapy: 19KZQ5804 to (Evaluate:10Mar2016)  Requested for: 01VBV5078; Last   Rx:05Vew2124 Ordered   7  Furosemide 40 MG Oral Tablet; TAKE 1 TABLET DAILY; Therapy: 78UJO6459 to (Evaluate:13Yta4349)  Requested for: 12Jan2016; Last   Rx:12Jan2016 Ordered   8  Ipratropium-Albuterol 0 5-2 5 (3) MG/3ML Inhalation Solution; USE 1 VIAL VIA   NEBULIZER EVERY 4 HOURS AS NEEDED;    Therapy: 33DTT0114 to (Evaluate:20Jan2016)  Requested for: 41Sqv6860; Last   Rx:50Imr4195 Ordered   9  Lansoprazole 30 MG Oral Capsule Delayed Release; TAKE 1 CAPSULE Daily; Therapy: (Recorded:03Apr2014) to Recorded   10  Metamucil POWD; TWICE A DAY; Therapy: (Recorded:95Nlr2443) to Recorded   11  Nebulizer/Tubing/Mouthpiece KIT; USE AS DIRECTED; Therapy: 52QSC7172 to (Last Rx:11Lys5761) Ordered   12  PredniSONE 5 MG Oral Tablet; take 1 tablet by mouth daily; Therapy: 62VMB0569 to (Marcellus Kulkarni)  Requested for: 49DND1746; Last    Rx:25Ngr9894 Ordered   13  Symbicort 160-4 5 MCG/ACT Inhalation Aerosol; USE 2 INHALATIONS BY MOUTH    TWICE DAILY*** RINSE MOUTH AFTER USE; Therapy: 27RDD1256 to (Evaluate:03Deq7230)  Requested for: 58CDC2352; Last    Rx:12Jan2016 Ordered   14  TraMADol HCl - 50 MG Oral Tablet; 1-2 TAB PO TID PRN PAIN;    Therapy: 11ORY4690 to (Evaluate:24Mar2016); Last Rx:16Wlu2830 Ordered   15  Tylenol with Codeine #3 300-30 MG Oral Tablet (Acetaminophen-Codeine); TAKE 1    TABLET 3 TIMES DAILY AS NEEDED FOR PAIN;    Therapy: 18DUJ7835 to (Evaluate:02Apr2016) Recorded   16  Ventolin  (90 Base) MCG/ACT Inhalation Aerosol Solution; USE 2 PUFFS BY    MOUTH EVERY 4 TO 6 HOURS AS NEEDED; Therapy: 17AZO3861 to (Evaluate:77Cdq2414)  Requested for: 75FED0258; Last    Rx:21Nov2014 Ordered   17  Zolpidem Tartrate 10 MG Oral Tablet; TAKE 1 TABLET AT BEDTIME AS NEEDED; Therapy: 41PRC2021 to (Last Rx:02Mar2016) Ordered    Allergies    1   No Known Drug Allergies    Signatures   Electronically signed by : Ewa Randhawa, ; Mar  3 2016  4:50PM EST                       (Author)

## 2018-01-15 NOTE — MISCELLANEOUS
Message   Recorded as Task   Date: 06/07/2016 09:09 AM, Created By: [address] [place] [address]   Task Name: Care Coordination   Assigned To: [address] [facility], [address] [facility] Clinical,Team   Regarding Patient: Onita Face, Status: Active   Comment:    Melody Key - 07 Jun 2016 9:09 AM     TASK CREATED  Caller: Self; Care Coordination; [phone] (Mobile Phone)  Pt lmom stating that he was in the hospital on saturday and they changed his oxycontin 5mg to 10mg but doesn't think David Cody wants him to do that  Spoke w/pt, states that he went to the [facility]  on saturday due to severe abdomen & chest pain and lung issues  Pt was given a script for oxycodone 10/325mg QID  Pt stopped the oxycontin 10mg and oxycodone 5/325mg and has just been taking the oxycodone as prescribed by the doctor in the hospital  Pt inquiring on what he should do  [facility] Jun 2016 10:11 AM     TASK REPLIED TO: Previously Assigned To [facility] Clinical,Team  Is the oxycodone/acetaminophen 10/325 mg QID PRN pain helping him? Is he able to cut down on that to three tablets per day? If so, I would suggest going to oxycodone/acetaminophen 10/325 mg TID PRN pain  If not, I would have him go back to the regimen that I prescribed him  Melody Key - 07 Jun 2016 10:41 AM     TASK EDITED  Spoke to pt, states that the oxycodone 10/325mg is helping with his pain  He is going to decrease it to TID and see how he does  Pt inquring whether he should restart the oxycontin 10mg bid as he stopped it on sunday  Also pt states he has been taking baclofen for approx 1 week now and it is not effective for muscle spasms  Pt c/o feeling "groggy "   Nano Spears - 07 Jun 2016 10:55 AM     TASK REPLIED TO: Previously Assigned To Nettie Henriquez 17  Do not restart oxycontin  Continue taking purely oxycodone/acetaminophen 10/325 mg TID PRN pain      I will send Edie Cardenas to his pharmacy for him to trial    Melody Key - 07 Jun 2016 11:04 AM     TASK EDITED  Pt aware  Instructed to call w/any further questions or concerns  Active Problems    1  Abnormal chest CT (793 2) (R93 8)   2  Acinetobacter lwoffi infection (041 85) (A49 8)   3  Allergic rhinitis (477 9) (J30 9)   4  Anticoagulant long-term use (V58 61) (Z79 01)   5  Anxiety (300 00) (F41 9)   6  Benign essential hypertension (401 1) (I10)   7  Bronchiectasis (494 0) (J47 9)   8  Bronchiectasis with acute exacerbation (494 1) (J47 1)   9  Chest pain on breathing (786 52) (R07 1)   10  CHF (congestive heart failure) (428 0) (I50 9)   11  Chronic bronchitis (491 9) (J42)   12  Chronic narcotic dependence (304 91) (F11 20)   13  Chronic obstructive pulmonary disease (496) (J44 9)   14  Chronic pain (338 29) (G89 29)   15  Chronic pain syndrome (338 4) (G89 4)   16  Chronic respiratory failure with hypoxia (518 83,799 02) (J96 11)   17  Chronic thoracic spine pain (724 1,338 29) (M54 6,G89 29)   18  Constipation, unspecified constipation type (564 00) (K59 00)   19  Cor pulmonale (416 9) (I27 81)   20  Cough (786 2) (R05)   21  Depression (311) (F32 9)   22  Edema (782 3) (R60 9)   23  Emphysema (492 8) (J43 9)   24  Encounter for long-term use of opiate analgesic (V58 69) (Z79 891)   25  Esophageal reflux (530 81) (K21 9)   26  Grief at loss of child (309 0) (F43 21,Z63 4)   27  Insomnia (780 52) (G47 00)   28  Low back pain (724 2) (M54 5)   29  Lymphadenopathy, mediastinal (785 6) (R59 0)   30  Myofascial pain syndrome (729 1) (M79 1)   31  Nonspecific abnormal findings on radiological and examination of lung field (793 19)    (R91 8)   32  Osteoporosis (733 00) (M81 0)   33  Pain, upper back (724 5) (M54 9)   34  Paroxysmal atrial fibrillation (427 31) (I48 0)   35  Pneumonia (486) (J18 9)   36  PSVT (paroxysmal supraventricular tachycardia) (427 0) (I47 1)   37  Pulmonary mycobacteria (031 0) (A31 0)   38  Pulmonary nodule seen on imaging study (793 11) (R91 1)   39  Rib pain (786 50) (R07 81)   40   Screening for colon cancer (V76 51) (Z12 11)   41  Shortness of breath (786 05) (R06 02)   42  Steroid dependent (V58 65)   43  Traumatic compression fracture of T6 thoracic vertebra (805 2) (S22 050A)   44  Ventricular bigeminy (427 89) (I49 9)   45  Wrist pain, right (719 43) (M25 531)    Current Meds   1  ALPRAZolam 0 5 MG Oral Tablet; TAKE 1 TABLET EVERY 6 TO 8 HOURS AS NEEDED; Therapy: 23SCJ5220 to (Evaluate:01Jun2016); Last Rx:17May2016 Ordered   2  Calcium 788--577 Oral Tablet; tke one tab daily; Therapy: (Recorded:05Apr2016) to Recorded   3  Diltiazem HCl  MG Oral Capsule Extended Release 24 Hour; TAKE ONE   CAPSULE BY MOUTH EVERY DAY; Therapy: 92JLU1172 to ()  Requested for: 28Apr2016; Last   Rx:28Apr2016 Ordered   4  Eliquis 5 MG Oral Tablet; TWICE A DAY; Therapy: 16VPV7615 to Recorded   5  Flecainide Acetate 100 MG Oral Tablet; take 1 tablet by mouth twice daily; Therapy: 66ZJX2733 to (Evaluate:12Waw3619)  Requested for: 10EYS1868; Last   Rx:17Jan2016 Ordered   6  Furosemide 40 MG Oral Tablet; TAKE 1 TABLET TWICE DAILY; Therapy: 28Apr2016 to (Evaluate:39Tkp3988)  Requested for: 28Apr2016; Last   Rx:28Apr2016 Ordered   7  Ipratropium-Albuterol 0 5-2 5 (3) MG/3ML Inhalation Solution; USE 1 VIAL VIA   NEBULIZER EVERY 4 HOURS AS NEEDED; Therapy: 11OWD9941 to (Evaluate:24Jun2016)  Requested for: 93XJD7820; Last   Rx:98Aqn9776 Ordered   8  Lansoprazole 30 MG Oral Capsule Delayed Release; TAKE 1 CAPSULE Daily; Therapy: (Recorded:03Apr2014) to Recorded   9  Lorzone 750 MG Oral Tablet; TAKE 1 TABLET 3 times daily PRN muscle spasms; Therapy: 06QUI0351 to (Evaluate:03Egm0293)  Requested for: 15VWJ9709; Last   Rx:07Jun2016 Ordered   10  Metamucil POWD; TWICE A DAY; Therapy: (Recorded:67Duw7299) to Recorded   11  MiraLax Oral Powder (Polyethylene Glycol 3350); take 17GM (DISSOLVED IN WATER    OR JUICE) by mouth once daily;     Therapy: 32ZCP3328 to (Evaluate:02Tim7254) Recorded   12  Oxycodone-Acetaminophen 5-325 MG Oral Tablet; TAKE 1 TABLET 3 times daily PRN    pain; Therapy: 18FFI2460 to (Evaluate:82Cue3885); Last Rx:30Hes6674 Ordered   13  OxyCONTIN 10 MG Oral Tablet ER 12 Hour Abuse-Deterrent; TAKE 1 TABLET EVERY    12 HOURS DAILY; Therapy: 98UAO0196 to (Evaluate:89Nrf2195); Last Rx:99Iar5208 Ordered   14  PredniSONE 10 MG Oral Tablet; TAKE 4 TABLETS DAILY FOR 3 DAYS,3 TABLETS    DAILY FOR 3 DAYS, 2 TABLETS DAILY FOR 3 DAYS AND 1 TABLET DAILY FOR    3 DAYS, THEN STOP; Therapy: 26VBM8424 to (Evaluate:09Jun2016)  Requested for: 39LGJ0506; Last    FY:87MFS3754 Ordered   15  PredniSONE 5 MG Oral Tablet; take 1 tablet by mouth daily; Therapy: 54QHW8138 to (Evaluate:20Apr2016)  Requested for: 40OLY4379; Last    Rx:21Mar2016 Ordered   16  Symbicort 160-4 5 MCG/ACT Inhalation Aerosol; USE 2 INHALATIONS BY MOUTH    TWICE DAILY*** RINSE MOUTH AFTER USE; Therapy: 46ARJ8748 to (Evaluate:90Qhi0013)  Requested for: 32GDF6145 Recorded   17  Ventolin  (90 Base) MCG/ACT Inhalation Aerosol Solution; USE 2 PUFFS BY    MOUTH EVERY 4 TO 6 HOURS AS NEEDED; Therapy: 62YYV9178 to (Evaluate:47Htr4406)  Requested for: 43YID5824; Last    Rx:60Tdp6440 Ordered   18  Zolpidem Tartrate 10 MG Oral Tablet; TAKE 1 TABLET AT BEDTIME AS NEEDED; Therapy: 44XTU2464 to (Last Rx:39Cpx4965) Ordered    Allergies    1   No Known Drug Allergies    Signatures   Electronically signed by : Shey Sykes RN; Jun 7 2016 11:04AM EST                       (Author)

## 2018-01-16 NOTE — MISCELLANEOUS
Assessment    1  Cor pulmonale (416 9) (I27 81)   2  Constipation, unspecified constipation type (564 00) (K59 00)   3  Bronchiectasis with acute exacerbation (494 1) (J47 1)   4  Chronic narcotic dependence (304 91) (F11 20)   5  Benign essential hypertension (401 1) (I10)   6  Paroxysmal atrial fibrillation (427 31) (I48 0)   7  Anticoagulant long-term use (V58 61) (Z79 01)    Plan  Anxiety    · ALPRAZolam 0 5 MG Oral Tablet; TAKE 1 TABLET EVERY 6 TO 8 HOURS AS  NEEDED   Rx By: Amador Haji; Dispense: 15 Days ; #:60 Tablet; Refill: 0; For: Anxiety; HASEEB = N; Print Rx  Benign essential hypertension    · Follow-up visit in 4 Months Evaluation and Treatment  Follow-up  Status: Hold For -  Scheduling  Requested for: 11VIN6897   Ordered; For: Benign essential hypertension; Ordered By: Amador Haji Performed:  Due: 28YPM5667  Chronic obstructive pulmonary disease    · Ventolin  (90 Base) MCG/ACT Inhalation Aerosol Solution; USE 2 PUFFS  BY MOUTH EVERY 4 TO 6 HOURS AS NEEDED   Rx By: Amador Haji; Dispense: 30 Days ; #:1 X 18 GM Inhaler; Refill: 2; For: Chronic obstructive pulmonary disease; HASEEB = N; Verified Transmission to [address] [place] [address]; Last Updated By: System, [employer]; 5/17/2016 9:21:34 AM    Chief Complaint  Chief Complaint Free Text Note Form: Hospital follow-up      History of Present Illness  TCM Communication [facility]: The patient is being contacted for follow-up after hospitalization  Hospital course was discussed with the inpatient physician  He was hospitalized at [facility]  The dates of hospitalization: May 5, 2016, date of admission: May 5, 2016, date of discharge: May 9, 2016  Diagnosis: shortness of breath  He was discharged to home  Medications reviewed and updated today  He scheduled a follow up appointment  Follow-up appointments with other specialists: Dr Suellen Gold     Symptoms: weakness, dizziness, fatigue, shortness of breath, back pain right side, middle abdominal pain, lower abdominal pain, anorexia, nausea, constipation, swelling and swelling location feet, but no fever, no headache, no cough, no chest pain, no back pain on left side, no arm pain left side, no arm pain on right side, no leg pain on left side, no leg pain on right side, no upper abdominal pain, no rash:, no vomiting, no loose stools, no pain with urinating, no incisional pain and no wound drainage  Counseling was provided to the patient  Topics counseled included diagnostic results  Communication performed and completed by Keya Peoples   HPI: He was hospitalized again for COPD, CHF, paroxysmal atrial fibrillation  He was started on Eliquis and is on a Prednisone taper  He is down to 10mg BID now  He was on [facility] but he switched back to Symbicort  He didn't tolerate Cymbalta in February but is getting good relief of his anxiety with Alprazolam 0 5mg TID PRN  He sees Dr Arrooy Winfield tomorrow  His pain is controlled with Oxycodone ER daily plus Percocet as needed  He uses oxygen overnight at 2L  Appetite is good with the Prednisone  Sleep could be better due to the back pain  Slight ankle edema but improved  Not much cough  Using a cane for stability  His usual O2 sat without O2 is about 92; goes to 92% with O2  VNA coming every other day  He was constipated in the hospital but now uses Miralax with good results  Active Problems    1  Abnormal chest CT (793 2) (R93 8)   2  Acinetobacter lwoffi infection (041 85) (A49 8)   3  Allergic rhinitis (477 9) (J30 9)   4  Benign essential hypertension (401 1) (I10)   5  Bronchiectasis (494 0) (J47 9)   6  Bronchiectasis with acute exacerbation (494 1) (J47 1)   7  Chest pain on breathing (786 52) (R07 1)   8  Chronic bronchitis (491 9) (J42)   9  Chronic narcotic dependence (304 91) (F11 20)   10  Chronic obstructive pulmonary disease (496) (J44 9)   11  Chronic pain (338 29) (G89 29)   12   Chronic pain syndrome (338 4) (G89 4)   13  Chronic respiratory failure with hypoxia (518 83,799 02) (J96 11)   14  Chronic thoracic spine pain (724 1,338 29) (M54 6,G89 29)   15  Cor pulmonale (416 9) (I27 81)   16  Cough (786 2) (R05)   17  Depression (311) (F32 9)   18  Edema (782 3) (R60 9)   19  Emphysema (492 8) (J43 9)   20  Encounter for long-term use of opiate analgesic (V58 69) (Z79 891)   21  Esophageal reflux (530 81) (K21 9)   22  Grief at loss of child (309 0) (F43 21,Z63 4)   23  Insomnia (780 52) (G47 00)   24  Lymphadenopathy, mediastinal (785 6) (R59 0)   25  Nonspecific abnormal findings on radiological and examination of lung field (793 19)    (R91 8)   26  Osteoporosis (733 00) (M81 0)   27  Pain, upper back (724 5) (M54 9)   28  Paroxysmal atrial fibrillation (427 31) (I48 0)   29  Pneumonia (486) (J18 9)   30  PSVT (paroxysmal supraventricular tachycardia) (427 0) (I47 1)   31  Pulmonary mycobacteria (031 0) (A31 0)   32  Pulmonary nodule seen on imaging study (793 11) (R91 1)   33  Rib pain (786 50) (R07 81)   34  Screening for colon cancer (V76 51) (Z12 11)   35  Shortness of breath (786 05) (R06 02)   36  Steroid dependent (V58 65)   37  Traumatic compression fracture of T6 thoracic vertebra (805 2) (S22 050A)   38  Ventricular bigeminy (427 89) (I49 9)   39  Wrist pain, right (719 43) (M25 531)    Past Medical History    1  History of Closed Capitellar Fracture Of The Left Humerus Without Extension (812 44)   2  History of Closed Epiphyseal Fracture Of The Proximal End Of The Left Humerus   (812 09)   3  History of Closed Fracture Of The Left Medial Tibial Plateau (Schatzker 4) (823 00)   4  History of Closed Fracture Of The Left Tibia Intercondylar Independence With Anterior Edge   Elevation Martha Galeas And Calin 1) (823 00)   5  History of Hand pain, unspecified laterality   6  History of acute respiratory failure (V12 69) (Z87 09)   7  History of acute sinusitis (V12 69) (Z87 09)   8   History of bacterial pneumonia (V12 61) (Z87 01)   9  History of chronic obstructive lung disease (V12 69) (Z87 09)   10  History of fracture of humerus (V15 51) (Z87 81)   11  History of Paroxysmal atrial fibrillation (427 31) (I48 0)   12  Pneumonia (486) (J18 9)   13  History of Post Adult Respiratory Distress Syndrome (518 82)   14  History of Septic Shock (Pseudomonas) (785 59)    Surgical History    1  History of Cataract Surgery   2  History of Complete Colonoscopy   3  History of Femoral Hernia Repair   4  History of Inguinal Hernia Repair   5  History of Umbilical Hernia Repair    Family History  Mother    1  Family history of Emphysema  Father    2  Family history of Chronic Interstitial Lung Disease  Family History    3  Family history of Arthritis (V17 7)   4  Family history of Lung Cancer (V16 1)   5  Family history of Osteoporosis (V17 81)    Social History    · Denied: History of Alcohol Use (History)   · Caffeine Use   · Current Diet High In Sugar Includes High Sugar Beverages   · Daily Coffee Consumption (8  Cups/Day)   · Education - Highest Level Achieved (9  Years Completed)   · Former smoker (X34 85) (Z87 891)   · Marital History - Currently    · Occupation:   · Denied: History of Tobacco Use    Current Meds   1  ALPRAZolam 0 5 MG Oral Tablet; TAKE 1 TABLET EVERY 6 TO 8 HOURS AS NEEDED; Therapy: 40BCT9240 to (Evaluate:75Mma6867) Recorded   2  Calcium 983--524 Oral Tablet; tke one tab daily; Therapy: (Recorded:05Apr2016) to Recorded   3  Diltiazem HCl  MG Oral Capsule Extended Release 24 Hour; TAKE ONE   CAPSULE BY MOUTH EVERY DAY; Therapy: 24SYL2020 to (869 258 942)  Requested for: 28Apr2016; Last   Rx:28Apr2016 Ordered   4  Flecainide Acetate 100 MG Oral Tablet; take 1 tablet by mouth twice daily; Therapy: 53EES8346 to (Evaluate:83Asm5433)  Requested for: 99JVC4375; Last   Rx:17Jan2016 Ordered   5  Furosemide 40 MG Oral Tablet; TAKE 1 TABLET TWICE DAILY;    Therapy: 28Apr2016 to (Evaluate:22Cat7904)  Requested for: 2016; Last   Rx:2016 Ordered   6  Ipratropium-Albuterol 0 5-2 5 (3) MG/3ML Inhalation Solution; USE 1 VIAL VIA   NEBULIZER EVERY 4 HOURS AS NEEDED; Therapy: 02RUW8779 to (Jorge Luis Alegria)  Requested for: 2016; Last   Rx:2016 Ordered   7  Lansoprazole 30 MG Oral Capsule Delayed Release; TAKE 1 CAPSULE Daily; Therapy: (Recorded:2014) to Recorded   8  Metamucil POWD; TWICE A DAY; Therapy: (Recorded:88Gql1423) to Recorded   9  MiraLax Oral Powder; take 17GM (DISSOLVED IN WATER OR JUICE) by mouth once   daily; Therapy: 12WCJ5847 to (Evaluate:46Cuw9336) Recorded   10  Oxycodone-Acetaminophen 5-325 MG Oral Tablet; TAKE 1 TABLET 3 times daily PRN    pain; Therapy: 12UVB0804 to (Evaluate:2016); Last AS:95PRM3948 Ordered   11  OxyCONTIN 10 MG Oral Tablet ER 12 Hour Abuse-Deterrent; Take 1 tablet daily; Therapy: 01WVA7331 to (Evaluate:2016); Last MT:92CKH2477 Ordered   12  PredniSONE 10 MG Oral Tablet; TAKE 4 TABLETS DAILY FOR 3 DAYS,3 TABLETS DAILY    FOR 3 DAYS, 2 TABLETS DAILY FOR 3 DAYS AND 1 TABLET DAILY FOR 3 DAYS,    THEN STOP; Therapy: 72YRY9366 to (Evaluate:2016)  Requested for: 66KXQ4809; Last    L18YSO2007 Ordered   13  PredniSONE 5 MG Oral Tablet; take 1 tablet by mouth daily; Therapy: 05AFK7957 to (Evaluate:2016)  Requested for: 78FGD7174; Last    Rx:2016 Ordered   14  Symbicort 160-4 5 MCG/ACT Inhalation Aerosol; USE 2 INHALATIONS BY MOUTH TWICE    DAILY*** RINSE MOUTH AFTER USE; Therapy: 38SBL2318 to (Evaluate:57Haq8032)  Requested for: 28HRO1473 Recorded   15  Ventolin  (90 Base) MCG/ACT Inhalation Aerosol Solution; USE 2 PUFFS BY    MOUTH EVERY 4 TO 6 HOURS AS NEEDED; Therapy: 50KYH7742 to (Evaluate:84Fma4481)  Requested for: 03YST2360; Last    Rx:2014 Ordered   16  Zolpidem Tartrate 10 MG Oral Tablet; TAKE 1 TABLET AT BEDTIME AS NEEDED;     Therapy: 48NHJ5809 to (Last Rx:2016) Ordered    Allergies    1  No Known Drug Allergies    Vitals  Signs [Data Includes: Current Encounter]   Recorded: 57USG0643 00:62AP   Systolic: 287, LUE, Sitting  Diastolic: 88, LUE, Sitting  Recorded: 67LMW1702 09:03AM   Temperature: 98 F  Heart Rate: 86  Respiration: 24  Systolic: 901  Diastolic: 80  Height: 5 ft 6 in  Weight: 188 lb 8 0 oz  BMI Calculated: 30 42  BSA Calculated: 1 96  O2 Saturation: 89    Physical Exam    Constitutional   General appearance: No acute distress, well appearing and well nourished  (Mildly Cushingoid )   Pulmonary   Respiratory effort: No increased work of breathing or signs of respiratory distress  Auscultation of lungs: Abnormal   (Scattered expiratory rhonchi and wheezes)   Cardiovascular   Auscultation of heart: Normal rate and rhythm, normal S1 and S2, without murmurs  Examination of extremities for edema and/or varicosities: Abnormal   bilateral ankle 1+ pitting edema  Carotid pulses: Normal     Abdomen   Abdomen: Non-tender, no masses  Liver and spleen: No hepatomegaly or splenomegaly  Lymphatic   Palpation of lymph nodes in neck: No lymphadenopathy  Psychiatric   Orientation to person, place and time: Normal     Mood and affect: Normal          Health Management  Screening for colon cancer   COLONOSCOPY; every 10 years; Next Due: 44VLY7614; Overdue    Message   Recorded as Task   Date: 05/09/2016 08:39 PM, Created By: System   Task Name: Nancy Burt   Assigned To: Olya Marcus   Regarding Patient: Stalin Ann, Status: In Progress   Comment:    System - 09 May 2016 8:39 PM     Patient discharged from hospital   Patient Name: River Stacy  Patient YOB: 1960  Discharge Date: 5/9/2016  Facility: [facility] - 10 May 2016 11:27 AM     TASK IN PROGRESS     Future Appointments    Date/Time Provider Specialty Site   05/31/2016 08:15 AM SARAH Richter   Pain Management [facility]   05/18/2016 01:30 PM Jacinta Hernandez DO Pulmonary Medicine  [address]     Signatures   Electronically signed by : SARAH Blankenship ; May 17 2016  9:40AM EST                       (Author)

## 2018-01-16 NOTE — PROCEDURES
Procedures by Blanca Jernigan RN  at 10/4/2016  4:13 PM      Author:  Blanca Jernigan RN Service:  Interventional Radiology  Author Type:  Registered Nurse    Filed:  10/4/2016  4:16 PM Date of Service:  10/4/2016  4:13 PM Status:  Attested    :  Blanca Jernigan RN (Registered Nurse)  Cosigner:  Blake Braun MD at 10/5/2016  8:07 AM      Procedure Orders:       1  Insert PICC line [[id number]] ordered by Blanca Jernigan RN at 10/04/16 7787               Attestation signed by Blake Branu MD at 10/5/2016  8:07 AM           picc line is ready for use                                           PICC Line Insertion  Date/Time: 10/4/2016 4:14 PM  Performed by: Heidi Bernal by: Elicia Breen     Patient location:  Other (comment) (IR)  Consent:     Consent obtained:  Verbal and written    Risks discussed:  Bleeding, infection, incorrect placement, nerve damage and arterial puncture    Alternatives discussed:  No treatment and delayed treatment  Universal protocol:     Procedure explained and questions answered to patient or proxy's satisfaction: yes      Immediately prior to procedure, a time out was called: yes      Patient identity confirmed:  Verbally with patient and arm band  Pre-procedure details:     Hand hygiene: Hand hygiene performed prior to insertion      Sterile barrier technique: All elements of maximal sterile technique followed      Skin preparation:  ChloraPrep    Skin preparation agent: Skin preparation agent completely dried prior to procedure    Indications:     PICC line indications: vascular access    Anesthesia (see MAR for exact dosages):      Anesthesia method:  Local infiltration    Local anesthetic:  Lidocaine 1% w/o epi  Procedure details:     Location:  [place]    Vessel type: vein      Laterality:  Right    Approach: percutaneous technique used      Patient position:  Flat    Procedural supplies:  Double lumen    Catheter size:  5 Fr    Landmarks identified: yes Ultrasound guidance: yes      Sterile ultrasound techniques: Sterile gel and sterile probe covers were used      Number of attempts:  3    Successful placement: yes      Vessel of catheter tip end:  Svc    Total catheter length (cm):  43    Catheter out on skin (cm):  0    Max flow rate:  999ml/hr    Arm circumference:  35cm  Post-procedure details:     Post-procedure:  Dressing applied    Assessment:  Placement verified by x-ray    Post-procedure complications: none      Patient tolerance of procedure:   Tolerated well, no immediate complications                     Received for:Provider  EPIC   Oct  5 2016  8:07AM [facility] Standard Time

## 2018-01-16 NOTE — MISCELLANEOUS
Message   Date: 03 Aug 2016 10:29 AM EST, Recorded By: Brandin Huizar For: Lisa Abarcatrom: Aurelio Bustamante, Nona   Phone: [phone]   Reason: Other   Robert Primes is the physical therapist for the [employer]  She called our office informing us she has done an assessment on Mr  Nidia Cruz and would like to follow him for therapy  I spoke with our PA, Annalisa Suárez for   [address] and she recommended for physical therapy inquiries, Robert Primes should contact the patient's primary care physician  Robert Primes agreed to do so//Jody//        Active Problems    1  Abnormal chest CT (793 2) (R93 8)   2  Acinetobacter lwoffi infection (041 85) (A49 8)   3  Acute deep vein thrombosis (DVT) of left lower extremity, unspecified vein (453 40)   (I82 402)   4  Allergic rhinitis (477 9) (J30 9)   5  Anticoagulant long-term use (V58 61) (Z79 01)   6  Anxiety (300 00) (F41 9)   7  Benign essential hypertension (401 1) (I10)   8  Bronchiectasis (494 0) (J47 9)   9  Bronchiectasis with acute exacerbation (494 1) (J47 1)   10  Chest pain on breathing (786 52) (R07 1)   11  CHF (congestive heart failure) (428 0) (I50 9)   12  Chronic bronchitis (491 9) (J42)   13  Chronic narcotic dependence (304 91) (F11 20)   14  Chronic obstructive pulmonary disease (496) (J44 9)   15  Chronic pain (338 29) (G89 29)   16  Chronic pain syndrome (338 4) (G89 4)   17  Chronic respiratory failure with hypoxia (518 83,799 02) (J96 11)   18  Chronic thoracic spine pain (724 1,338 29) (M54 6,G89 29)   19  Constipation, unspecified constipation type (564 00) (K59 00)   20  Cor pulmonale (416 9) (I27 81)   21  Cough (786 2) (R05)   22  Depression (311) (F32 9)   23  Edema (782 3) (R60 9)   24  Emphysema (492 8) (J43 9)   25  Encounter for long-term use of opiate analgesic (V58 69) (Z79 891)   26  Esophageal reflux (530 81) (K21 9)   27  Grief at loss of child (309 0) (F43 21,Z63 4)   28  Insomnia (780 52) (G47 00)   29   Low back pain (724 2) (M54 5)   30  Lymphadenopathy, mediastinal (785 6) (R59 0)   31  Myofascial pain syndrome (729 1) (M79 1)   32  Nonspecific abnormal findings on radiological and examination of lung field (793 19)    (R91 8)   33  Osteoporosis (733 00) (M81 0)   34  Pain, upper back (724 5) (M54 9)   35  Paroxysmal atrial fibrillation (427 31) (I48 0)   36  Pneumonia (486) (J18 9)   37  PSVT (paroxysmal supraventricular tachycardia) (427 0) (I47 1)   38  Pulmonary mycobacteria (031 0) (A31 0)   39  Pulmonary nodule seen on imaging study (793 11) (R91 1)   40  Rib pain (786 50) (R07 81)   41  Screening for colon cancer (V76 51) (Z12 11)   42  Shortness of breath (786 05) (R06 02)   43  Steroid dependent (V58 65)   44  Traumatic compression fracture of T6 thoracic vertebra (805 2) (S22 050A)   45  Ventricular bigeminy (427 89) (I49 9)   46  Wrist pain, right (719 43) (M25 531)    Current Meds   1  ALPRAZolam 0 5 MG Oral Tablet; TAKE 1 TABLET EVERY 6 TO 8 HOURS AS NEEDED; Therapy: 03AAO5301 to (Evaluate:00Cql7960); Last Rx:91Uxm1619 Ordered   2  Calcium 905--417 Oral Tablet; tke one tab daily; Therapy: (Recorded:05Apr2016) to Recorded   3  Diltiazem HCl  MG Oral Capsule Extended Release 24 Hour; TAKE ONE   CAPSULE BY MOUTH EVERY DAY; Therapy: 40KAZ7085 to [phone])  Requested for: 28Apr2016; Last   Rx:28Apr2016 Ordered   4  Eliquis 5 MG Oral Tablet; TWICE A DAY; Therapy: 84UCL8501 to Recorded   5  Flecainide Acetate 100 MG Oral Tablet; take 1 tablet by mouth twice daily; Therapy: 83FPC1933 to (Evaluate:38Sme9213)  Requested for: 37JAO5149; Last   Rx:17Jan2016 Ordered   6  Furosemide 40 MG Oral Tablet; TAKE 1 TABLET TWICE DAILY; Therapy: 28Apr2016 to (Evaluate:81Nws9887)  Requested for: 28Apr2016; Last   Rx:28Apr2016 Ordered   7  Ipratropium-Albuterol 0 5-2 5 (3) MG/3ML Inhalation Solution; USE 1 VIAL VIA   NEBULIZER EVERY 4 HOURS AS NEEDED;    Therapy: 06EFI0218 to (Evaluate:17Zqx1653) Requested for: 33Bsv6499; Last   Rx:13Iqk1215 Ordered   8  Lansoprazole 30 MG Oral Capsule Delayed Release; TAKE 1 CAPSULE Daily; Therapy: (Recorded:00Bbz7708) to Recorded   9  Lorzone 750 MG Oral Tablet; TAKE 1 TABLET 3 times daily PRN muscle spasms; Therapy: 11TTK6209 to (Evaluate:46Llz7584)  Requested for: 56TJE9316; Last   Rx:88Fwr6607 Ordered   10  Metamucil POWD; TWICE A DAY; Therapy: (Recorded:94Yja5197) to Recorded   11  MiraLax Oral Powder (Polyethylene Glycol 3350); take 17GM (DISSOLVED IN WATER    OR JUICE) by mouth once daily; Therapy: 19IQM3582 to (Evaluate:17Szo7410) Recorded   12  Oxycodone-Acetaminophen  MG Oral Tablet; TAKE 1 TABLET 4 times daily PRN    pain; Therapy: 05Apr2016 to (Evaluate:95Ylp0010); Last Rx:48Cnc9292 Ordered   13  OxyCONTIN 10 MG Oral Tablet ER 12 Hour Abuse-Deterrent; TAKE 1 TABLET EVERY    12 HOURS DAILY; Therapy: 05Apr2016 to (Evaluate:58Rtn7777); Last Rx:37Fzg1166 Ordered   14  PredniSONE 10 MG Oral Tablet; Take 4 QD x 5 days then 3 QD x 5 days then 2 QD    x 5 days then 1 daily; Therapy: 15ZIP1552 to (Lisbeth Virk)  Requested for: 48ICJ7009; Last    Rx:44Yxu1446 Ordered   15  PredniSONE 5 MG Oral Tablet; take 1 tablet by mouth daily; Therapy: 83OEH2528 to (Evaluate:49Krz4117)  Requested for: 49FOM4524; Last    Rx:21Mar2016 Ordered   16  Symbicort 160-4 5 MCG/ACT Inhalation Aerosol; USE 2 INHALATIONS BY MOUTH    TWICE DAILY*** RINSE MOUTH AFTER USE; Therapy: 30WDO2385 to (Evaluate:34Zmy7749)  Requested for: 44SJH8314 Recorded   17  Ventolin  (90 Base) MCG/ACT Inhalation Aerosol Solution; USE 2 PUFFS BY    MOUTH EVERY 4 TO 6 HOURS AS NEEDED; Therapy: 96TIM7061 to (Evaluate:68Tsd6390)  Requested for: 43NZZ7165; Last    Rx:64Qrp3334 Ordered   18  Zolpidem Tartrate 10 MG Oral Tablet; TAKE 1 TABLET AT BEDTIME AS NEEDED; Therapy: 74VQF6054 to (Last Rx:39Gis1366) Ordered    Allergies    1   No Known Drug Allergies    Signatures Electronically signed by : Wanda Parikh, ; Aug  3 2016 10:32AM EST                       (Author)

## 2018-01-17 NOTE — MISCELLANEOUS
Message   Recorded as Task   Date: 07/26/2016 09:14 AM, Created By: Carol Dutta   Task Name: Med Renewal Request   Assigned To: [address] [facility] [facility],Team   Regarding Patient: Desi Machuca, Status: Active   Comment:    Melody Key - 26 Jul 2016 9:14 AM     TASK CREATED  Caller: Self; Renew Medication; [phone] (Home)  Pt lmom requesting a refill of oxycontin 10mg, he takes one tab bid  Pt has a f/u scheduled for 8/2/16  Jannie Blackwell - 26 Jul 2016 10:33 AM     TASK REPLIED TO: Previously Assigned To [address]  How many does he have left/ when is he due? Melody Key - 26 Jul 2016 11:07 AM     TASK EDITED           Pt states that he has 5 days worth left #10 tabs  Jannie Blackwell - 26 Jul 2016 3:07 PM     TASK REPLIED TO: Previously Assigned To Jannie Blackwell  Prescription printed  Melody Key - 26 Jul 2016 4:09 PM     TASK EDITED       Pt aware  Active Problems    1  Abnormal chest CT (793 2) (R93 8)   2  Acinetobacter lwoffi infection (041 85) (A49 8)   3  Acute deep vein thrombosis (DVT) of left lower extremity, unspecified vein (453 40)   (I82 402)   4  Allergic rhinitis (477 9) (J30 9)   5  Anticoagulant long-term use (V58 61) (Z79 01)   6  Anxiety (300 00) (F41 9)   7  Benign essential hypertension (401 1) (I10)   8  Bronchiectasis (494 0) (J47 9)   9  Bronchiectasis with acute exacerbation (494 1) (J47 1)   10  Chest pain on breathing (786 52) (R07 1)   11  CHF (congestive heart failure) (428 0) (I50 9)   12  Chronic bronchitis (491 9) (J42)   13  Chronic narcotic dependence (304 91) (F11 20)   14  Chronic obstructive pulmonary disease (496) (J44 9)   15  Chronic pain (338 29) (G89 29)   16  Chronic pain syndrome (338 4) (G89 4)   17  Chronic respiratory failure with hypoxia (518 83,799 02) (J96 11)   18  Chronic thoracic spine pain (724 1,338 29) (M54 6,G89 29)   19  Constipation, unspecified constipation type (564 00) (K59 00)   20   Cor pulmonale (416  9) (I27 81)   21  Cough (786 2) (R05)   22  Depression (311) (F32 9)   23  Edema (782 3) (R60 9)   24  Emphysema (492 8) (J43 9)   25  Encounter for long-term use of opiate analgesic (V58 69) (Z79 891)   26  Esophageal reflux (530 81) (K21 9)   27  Grief at loss of child (309 0) (F43 21,Z63 4)   28  Insomnia (780 52) (G47 00)   29  Low back pain (724 2) (M54 5)   30  Lymphadenopathy, mediastinal (785 6) (R59 0)   31  Myofascial pain syndrome (729 1) (M79 1)   32  Nonspecific abnormal findings on radiological and examination of lung field (793 19)    (R91 8)   33  Osteoporosis (733 00) (M81 0)   34  Pain, upper back (724 5) (M54 9)   35  Paroxysmal atrial fibrillation (427 31) (I48 0)   36  Pneumonia (486) (J18 9)   37  PSVT (paroxysmal supraventricular tachycardia) (427 0) (I47 1)   38  Pulmonary mycobacteria (031 0) (A31 0)   39  Pulmonary nodule seen on imaging study (793 11) (R91 1)   40  Rib pain (786 50) (R07 81)   41  Screening for colon cancer (V76 51) (Z12 11)   42  Shortness of breath (786 05) (R06 02)   43  Steroid dependent (V58 65)   44  Traumatic compression fracture of T6 thoracic vertebra (805 2) (S22 050A)   45  Ventricular bigeminy (427 89) (I49 9)   46  Wrist pain, right (719 43) (M25 531)    Current Meds   1  ALPRAZolam 0 5 MG Oral Tablet; TAKE 1 TABLET EVERY 6 TO 8 HOURS AS NEEDED; Therapy: 19BWA3563 to (Evaluate:88Haj5499); Last Rx:49Pwb8932 Ordered   2  Calcium 659--240 Oral Tablet; tke one tab daily; Therapy: (Recorded:05Apr2016) to Recorded   3  Diltiazem HCl  MG Oral Capsule Extended Release 24 Hour; TAKE ONE   CAPSULE BY MOUTH EVERY DAY; Therapy: 92REL3362 to [phone])  Requested for: 28Apr2016; Last   Rx:28Apr2016 Ordered   4  Eliquis 5 MG Oral Tablet; TWICE A DAY; Therapy: 20ORF6514 to Recorded   5  Flecainide Acetate 100 MG Oral Tablet; take 1 tablet by mouth twice daily;    Therapy: 82IUO0181 to (Evaluate:64Gel3415)  Requested for: 69EYZ3615; Last Rx:17Jan2016 Ordered   6  Furosemide 40 MG Oral Tablet; TAKE 1 TABLET TWICE DAILY; Therapy: 16Fbc6724 to (Evaluate:87Nxd9311)  Requested for: 79Ack1183; Last   Rx:28Apr2016 Ordered   7  Ipratropium-Albuterol 0 5-2 5 (3) MG/3ML Inhalation Solution; USE 1 VIAL VIA   NEBULIZER EVERY 4 HOURS AS NEEDED; Therapy: 01IWK6463 to (Evaluate:90Vlh9954)  Requested for: 88Eib1156; Last   Rx:09Maa3744 Ordered   8  Lansoprazole 30 MG Oral Capsule Delayed Release; TAKE 1 CAPSULE Daily; Therapy: (Recorded:52Dik6183) to Recorded   9  Lorzone 750 MG Oral Tablet; TAKE 1 TABLET 3 times daily PRN muscle spasms; Therapy: 25OTD1779 to (Evaluate:73Ara2921)  Requested for: 81QDP7314; Last   Rx:96Zqu7559 Ordered   10  Metamucil POWD; TWICE A DAY; Therapy: (Recorded:40Dgn7739) to Recorded   11  MiraLax Oral Powder (Polyethylene Glycol 3350); take 17GM (DISSOLVED IN WATER    OR JUICE) by mouth once daily; Therapy: 02IEG1480 to (Evaluate:41Var6565) Recorded   12  Oxycodone-Acetaminophen  MG Oral Tablet; TAKE 1 TABLET 4 times daily PRN    pain; Therapy: 82Qhj4195 to (Evaluate:97Orh9813); Last Rx:21Ndp1771 Ordered   13  OxyCONTIN 10 MG Oral Tablet ER 12 Hour Abuse-Deterrent; TAKE 1 TABLET EVERY    12 HOURS DAILY; Therapy: 33Uuh9040 to (Evaluate:95Qef4401); Last Rx:45Swx3380 Ordered   14  PredniSONE 10 MG Oral Tablet; Take 4 QD x 5 days then 3 QD x 5 days then 2 QD    x 5 days then 1 daily; Therapy: 64SVB2160 to (Ma Se[place])  Requested for: 79AOW5163; Last    Rx:29Jun2016 Ordered   15  PredniSONE 5 MG Oral Tablet; take 1 tablet by mouth daily; Therapy: 30JHS5909 to (Evaluate:61Qdj0791)  Requested for: 48GZC8091; Last    Rx:21Mar2016 Ordered   16  Symbicort 160-4 5 MCG/ACT Inhalation Aerosol; USE 2 INHALATIONS BY MOUTH    TWICE DAILY*** RINSE MOUTH AFTER USE; Therapy: 59OXX8709 to (Evaluate:44Ygy6985)  Requested for: 30XDN0612 Recorded   17   Ventolin  (90 Base) MCG/ACT Inhalation Aerosol Solution; USE 2 PUFFS BY    MOUTH EVERY 4 TO 6 HOURS AS NEEDED; Therapy: 42KGN6696 to (Evaluate:14Clq3435)  Requested for: 32MVZ1297; Last    Rx:37Qyj8024 Ordered   18  Zolpidem Tartrate 10 MG Oral Tablet; TAKE 1 TABLET AT BEDTIME AS NEEDED; Therapy: 60CYF3137 to (Last Rx:08Lra4247) Ordered    Allergies    1   No Known Drug Allergies    Signatures   Electronically signed by : Zaid Baldwin RN; Jul 26 2016  4:09PM EST                       (Author)

## 2018-01-17 NOTE — MISCELLANEOUS
Message   Recorded as Task   Date: 11/04/2016 12:52 PM, Created By: Mitchell Arnett   Task Name: Call Back   Assigned To: Adi Leonardo   Regarding Patient: Governor Carmichael, Status: Active   CommentTami Andrew - 04 Nov 2016 12:52 PM     TASK CREATED  Caller: Lana Montes RN, Care Giver; [phone]  Lana Montes RN called office with concerns of pt's recent weight gain  States pt has gained 7 6lbs within one week, Harriet Frank this is water weight  Pt is currently taking 120mg Lasix BID  Lana Montes would like a call back to discuss possible med change  Advised RN to provide additional diuretics to pt, who did not wish to go to hospital  Trial of bumex 2mg daily called out to pharmacy, per RN's request  Asked RN to inform pt to go to ED if he is not improved by Monday, or worse at any time  Trial to stop after two days, to avoid hypovolemia        Plan  CHF (congestive heart failure)    · Bumetanide 2 MG Oral Tablet; TAKE 1 TABLET DAILY    Signatures   Electronically signed by : SARAH Cook ; Nov 8 2016 10:48AM EST                       (Author)

## 2018-03-07 NOTE — PROGRESS NOTES
Message  Pulmonary PALS Program Enrollment Notification: This patient is enrolled in Pulmonary PALS  Followup call made to pt today after [facility]   Pt was referred to [facility] home care for services upon transfer home, thus will not be eligible for Pulmonary PALS program       Signatures   Electronically signed by : Yasmin Luis, ; Nov 4 2016  2:51PM EST                       (Author)

## 2018-03-07 NOTE — MISCELLANEOUS
History of Present Illness  COPD Subsequent Hospital Discharge Phone Calls: The patient is being contacted for continued follow-up after hospitalization  The patient was discharged from the hospital on 10/12/16  I spoke with Alva Sheridan  Narrative summary:    Shahla Ward to check status of patient, who had transitioned from [place] [address] to [facility] on 10/21/16  RN was busy with another patient and was given the message to call COPD team with an update  Current Meds    1  ALPRAZolam 0 5 MG Oral Tablet; TAKE 1 TABLET EVERY 6 TO 8 HOURS AS NEEDED; Therapy: 08QHP1660 to (Evaluate:05Oct2016); Last Rx:22Rrq0823 Ordered    2  Ipratropium-Albuterol 0 5-2 5 (3) MG/3ML Inhalation Solution; USE 1 VIAL VIA   NEBULIZER EVERY 4 HOURS AS NEEDED; Therapy: 03PLG3535 to (Evaluate:21Oct2016)  Requested for: 90DFM6459; Last   Rx:21Kep5378 Ordered    3  Potassium Chloride ER 20 MEQ Oral Tablet Extended Release; TAKE 1 TABLET DAILY   X3 DAYS; Therapy: 81YUR9884 to (Last Rx:93Okt2075)  Requested for: 25Hbm5549 Ordered    4  PredniSONE 5 MG Oral Tablet; take 1 tablet by mouth daily; Therapy: 35UHD2937 to (Evaluate:61Kpx9080)  Requested for: 69RXH6278; Last   Rx:21Mar2016 Ordered   5  Symbicort 160-4 5 MCG/ACT Inhalation Aerosol; USE 2 INHALATIONS BY MOUTH TWICE   DAILY*** RINSE MOUTH AFTER USE; Therapy: 24OZK5414 to (Evaluate:05Dmg1849)  Requested for: 76Cxh4470; Last   Rx:94Fjy0394 Ordered   6  Ventolin  (90 Base) MCG/ACT Inhalation Aerosol Solution; USE 2 PUFFS BY   MOUTH EVERY 4 TO 6 HOURS AS NEEDED; Therapy: 35YBM2521 to (Evaluate:96Znj9606)  Requested for: 64MEK0439; Last   Rx:21Ggx5579 Ordered    7  Lorzone 750 MG Oral Tablet; TAKE 1 TABLET 3 times daily PRN muscle spasms; Therapy: 81XPA8007 to (Evaluate:10Zfz8045)  Requested for: 43XTC3179; Last   Rx:96Xxe8045 Ordered    8   Oxycodone-Acetaminophen  MG Oral Tablet; TAKE 1 TABLET 4 times daily PRN   pain; Therapy: 05Apr2016 to (Evaluate:38Rak1351); Last Rx:89Khx6644 Ordered   9  OxyCONTIN 10 MG Oral Tablet ER 12 Hour Abuse-Deterrent; TAKE 1 TABLET EVERY 12   HOURS DAILY; Therapy: 05Apr2016 to (Evaluate:99Gzt4160); Last Rx:53Jxd3104 Ordered    10  MiraLax Oral Powder (Polyethylene Glycol 3350); take 17GM (DISSOLVED IN WATER OR    JUICE) by mouth once daily; Therapy: 07QZJ9615 to (Evaluate:18Xpy3911) Recorded    11  Furosemide 40 MG Oral Tablet; TAKE 1 TABLET TWICE DAILY; Therapy: 69Wqn3279 to (Evaluate:96Zcu1976)  Requested for: 28Apr2016; Last    Rx:28Apr2016 Ordered    12  Lansoprazole 30 MG Oral Capsule Delayed Release; TAKE 1 CAPSULE Daily; Therapy: (Recorded:03Apr2014) to Recorded    13  Zolpidem Tartrate 10 MG Oral Tablet; TAKE 1 TABLET AT BEDTIME AS NEEDED; Therapy: 00Kgi2698 to (Last Rx:10Aug2016) Ordered    14  DiltiaZEM HCl  MG Oral Capsule Extended Release 24 Hour; TAKE ONE    CAPSULE BY MOUTH EVERY DAY; Therapy: 13YAQ2667 to ([phone])  Requested for: 28Apr2016; Last    Rx:28Apr2016 Ordered   15  Eliquis 5 MG Oral Tablet; TWICE A DAY; Therapy: 03SXS5703 to Recorded   16  Flecainide Acetate 100 MG Oral Tablet; take 1 tablet by mouth twice daily; Therapy: 73LYE0258 to (Evaluate:49Egj6768)  Requested for: 10Aug2016; Last    Rx:10Aug2016 Ordered    17  Calcium 489--440 Oral Tablet; tke one tab daily; Therapy: (Recorded:55Pze7198) to Recorded   18  Metamucil POWD; TWICE A DAY; Therapy: (Recorded:30Zzj0548) to Recorded    Allergies    1  No Known Drug Allergies    Signatures   Electronically signed by :  RT Frankie; Oct 24 2016  4:36PM EST                       (Author)
